# Patient Record
Sex: MALE | Race: WHITE | NOT HISPANIC OR LATINO | URBAN - NONMETROPOLITAN AREA
[De-identification: names, ages, dates, MRNs, and addresses within clinical notes are randomized per-mention and may not be internally consistent; named-entity substitution may affect disease eponyms.]

---

## 2021-06-16 PROBLEM — I10 ESSENTIAL HYPERTENSION: Status: ACTIVE | Noted: 2019-09-11

## 2021-06-16 PROBLEM — F10.20 SEVERE ALCOHOL USE DISORDER (H): Status: ACTIVE | Noted: 2019-09-11

## 2021-06-16 PROBLEM — F33.1 MODERATE EPISODE OF RECURRENT MAJOR DEPRESSIVE DISORDER (H): Status: ACTIVE | Noted: 2019-09-11

## 2024-02-21 ENCOUNTER — HOSPITAL ENCOUNTER (EMERGENCY)
Facility: HOSPITAL | Age: 74
Discharge: HOME | End: 2024-02-21
Attending: EMERGENCY MEDICINE | Admitting: EMERGENCY MEDICINE

## 2024-02-21 VITALS
HEART RATE: 62 BPM | OXYGEN SATURATION: 96 % | DIASTOLIC BLOOD PRESSURE: 79 MMHG | RESPIRATION RATE: 16 BRPM | TEMPERATURE: 97.5 F | SYSTOLIC BLOOD PRESSURE: 147 MMHG

## 2024-02-21 DIAGNOSIS — F10.21 HISTORY OF ALCOHOLISM (H): ICD-10-CM

## 2024-02-21 PROCEDURE — 99282 EMERGENCY DEPT VISIT SF MDM: CPT | Performed by: EMERGENCY MEDICINE

## 2024-02-21 PROCEDURE — 99282 EMERGENCY DEPT VISIT SF MDM: CPT

## 2024-02-21 ASSESSMENT — COLUMBIA-SUICIDE SEVERITY RATING SCALE - C-SSRS
2. HAVE YOU ACTUALLY HAD ANY THOUGHTS OF KILLING YOURSELF IN THE PAST MONTH?: NO
1. IN THE PAST MONTH, HAVE YOU WISHED YOU WERE DEAD OR WISHED YOU COULD GO TO SLEEP AND NOT WAKE UP?: NO
2. HAVE YOU ACTUALLY HAD ANY THOUGHTS OF KILLING YOURSELF IN THE PAST MONTH?: NO
1. IN THE PAST MONTH, HAVE YOU WISHED YOU WERE DEAD OR WISHED YOU COULD GO TO SLEEP AND NOT WAKE UP?: NO
6. HAVE YOU EVER DONE ANYTHING, STARTED TO DO ANYTHING, OR PREPARED TO DO ANYTHING TO END YOUR LIFE?: NO
6. HAVE YOU EVER DONE ANYTHING, STARTED TO DO ANYTHING, OR PREPARED TO DO ANYTHING TO END YOUR LIFE?: NO

## 2024-02-21 ASSESSMENT — ACTIVITIES OF DAILY LIVING (ADL): ADLS_ACUITY_SCORE: 35

## 2024-02-21 NOTE — ED NOTES
Care Transitions focused note:      Patient here with Mobile Crisis from Detox for mental health evaluation.    Pt was at detox awaiting a spot at in [place] [facility] and they wanted to send him home to await the bed and he made statements that if he were to return home he would self harm. They called Mobile Crisis Team and he was brought here for evaluation.  HE currently is denying any type of self harm.  Mobile Crisis worker has left at this time.    It sounds to me like Detox wanted to discharge him but pt made those statements.  They thought maybe he could go to our MH unit or board here until tomorrow.  Pt is stating to me that he has a friend who rents his place in [place] who will bring him to [place] tomorrow.  Pt admits to making those statements so they would not discharge him home.    In any event patient is not holdable and would like to get his car, go to a hotel tonight to await getting in to his facility tomorrow [place].    [facility] [place] to Enriqueta Mabry per patient request for continued care [phone]    Call to Andria at [facility] and she stated they are worried that if he were to be discharged home that he would be suicidal.      Pt adamantly denies being suicidal at this time. He would like to go back to detox to await his placement in [place].  If this is not possible he has the resources to go to a hotel and wait for his [place] bed tomorrow.    Call to Andria at [facility].  Message was left.    Awaiting call back.    TC Jewell

## 2024-02-21 NOTE — ED NOTES
AVS reviewed, no questions at this time.   Patient is going to wait in the waiting room for a taxi.

## 2024-02-21 NOTE — ED TRIAGE NOTES
Pt presents with making a mistake.  Was at detox and made a statement that he shouldn't be living.  Pt made this statement about 2 hours ago. Pt is in detox for drinking, pt stated that he has a spot in [facility] but doesn't have set date yet.  Pt had to get testing done due to [place] of the [place] this past year.  Pt is staying at detox until he can go there.  Pt denies SI. Denies being homicidal.   Patient last drink was about 2-3 weeks ago. Pt was talking to an employee and made a statement that he's screwed up so many peoples lives and he maybe just shouldn't be here.  Pt does have hx of anxiety.

## 2024-02-21 NOTE — ED PROVIDER NOTES
History     Chief Complaint   Patient presents with    Psychiatric Evaluation    Anxiety     HPI  Jeffrey King is a 73 year old male who presents with reported behavioral disturbance at the detox center where he stated using her himself without a specific plan or otherwise.  He notes he has no means, does not want hurt himself and states that he was unhappy.  He contracts for safety on arrival.  He does not drink or use drugs.  He comes to us for detox.  He is transitioning to a treatment facility in the [facility].      Allergies:  No Known Allergies    Problem List:    Patient Active Problem List    Diagnosis Date Noted    Moderate episode of recurrent major depressive disorder (H) 2019     Priority: Medium    Severe alcohol use disorder (H) 2019     Priority: Medium    Essential hypertension 2019     Priority: Medium        Past Medical History:    No past medical history on file.    Past Surgical History:    No past surgical history on file.    Family History:    No family history on file.    Social History:  Marital Status:  Single [1]  Social History     Tobacco Use    Smoking status: Former     Packs/day: 0.25     Years: 15.00     Additional pack years: 0.00     Total pack years: 3.75     Types: Cigarettes     Quit date: 2015     Years since quittin.4    Smokeless tobacco: Never   Substance Use Topics    Alcohol use: Yes     Alcohol/week: 105.0 standard drinks of alcohol    Drug use: Never        Medications:    albuterol (PROAIR HFA;PROVENTIL HFA;VENTOLIN HFA) 90 mcg/actuation inhaler  ARIPiprazole (ABILIFY) 5 MG tablet  buPROPion (WELLBUTRIN XL) 300 MG 24 hr tablet  chlorthalidone (HYGROTEN) 25 MG tablet  escitalopram oxalate (LEXAPRO) 20 MG tablet  fluticasone propionate (FLONASE) 50 mcg/actuation nasal spray  hydrOXYzine pamoate (VISTARIL) 25 MG capsule  lisinopril (PRINIVIL,ZESTRIL) 5 MG tablet  melatonin 3 mg Tab tablet  multivitamin therapeutic tablet  naltrexone  (DEPADE) 50 mg tablet  propranolol (INDERAL LA) 60 mg 24 hr capsule  rosuvastatin (CRESTOR) 5 MG tablet  thiamine 100 MG tablet          Review of Systems  Respiratory denies.  Cardiovascular denies.  GI denies.   denies.    Physical Exam   BP: 147/79  Pulse: 62  Temp: 97.5  F (36.4  C)  Resp: 16  SpO2: 96 %      Physical Exam  Constitutional:       Appearance: Normal appearance.   HENT:      Head: Normocephalic and atraumatic.      Nose: Nose normal.   Eyes:      Conjunctiva/sclera: Conjunctivae normal.   Cardiovascular:      Rate and Rhythm: Normal rate.      Pulses: Normal pulses.   Pulmonary:      Effort: Pulmonary effort is normal. No respiratory distress.      Breath sounds: No wheezing.   Abdominal:      General: There is no distension.      Palpations: Abdomen is soft.   Musculoskeletal:         General: Normal range of motion.      Cervical back: Normal range of motion and neck supple.   Skin:     General: Skin is warm and dry.      Capillary Refill: Capillary refill takes less than 2 seconds.   Neurological:      General: No focal deficit present.      Mental Status: He is alert.   Psychiatric:         Mood and Affect: Mood normal.              No results found for this or any previous visit (from the past 24 hour(s)).    Medications - No data to display    Assessments & Plan (with Medical Decision Making)   Patient is highly pleasant, denies any suicidality, would like to go to the [place] treatment facility.  He is not holdable.  He has no other concerns.  He admits that he was disappointed with detox for not allowing him to have his hydroxyzine in a timely fashion and did not like the temperature in the shared room he had with someone else.  He states this is why he acted out stating what he did.  He reportedly told him he was better off dead.  No other specifics.  Contracts for safety.  No complaints at this time.  Plan to discharge home at this time as the patient desires as he is not holdable.  He  intends to go to [place] tomorrow for treatment       New Prescriptions    No medications on file       Final diagnoses:   History of alcoholism (H)       2/21/2024   [facility] EM[facility]GENC[facility]       Satnam Healy MD  02/21/24 8545

## 2024-02-21 NOTE — ED NOTES
"Patient brought in by [facility] mobile crisis staff.   Patient was in Detox in [place] and made the comment \"maybe I'd be better off dead\".    Patient does not have any suicidal ideations at this time, he does not have a plan to hurt himself or anyone else.     He is A&Ox4.   Pleasant, calm, cooperative.   "

## 2024-02-21 NOTE — ED NOTES
Patient stated he wants to get to his car in [place].  He will either stay in [facility] or drive to [place].  Requesting ride to [place]    Call to Detox, and they are requesting information to be faxed which I will do in case patient changes his mind.    Discharged per provider.  Pt adamantly denying any self harm.    TC Jewell

## 2024-10-24 ENCOUNTER — HOSPITAL ENCOUNTER (INPATIENT)
Facility: HOSPITAL | Age: 74
LOS: 12 days | Discharge: HOME | DRG: 885 | End: 2024-11-05
Attending: EMERGENCY MEDICINE | Admitting: STUDENT IN AN ORGANIZED HEALTH CARE EDUCATION/TRAINING PROGRAM
Payer: COMMERCIAL

## 2024-10-24 DIAGNOSIS — F10.20 SEVERE ALCOHOL USE DISORDER (H): ICD-10-CM

## 2024-10-24 DIAGNOSIS — F41.8 DEPRESSION WITH ANXIETY: ICD-10-CM

## 2024-10-24 DIAGNOSIS — F33.1 MODERATE EPISODE OF RECURRENT MAJOR DEPRESSIVE DISORDER (H): Primary | ICD-10-CM

## 2024-10-24 LAB
ALBUMIN SERPL BCG-MCNC: 4.3 G/DL (ref 3.5–5.2)
ALBUMIN UR-MCNC: NEGATIVE MG/DL
ALP SERPL-CCNC: 72 U/L (ref 40–150)
ALT SERPL W P-5'-P-CCNC: 72 U/L (ref 0–70)
AMPHETAMINES UR QL SCN: ABNORMAL
ANION GAP SERPL CALCULATED.3IONS-SCNC: 14 MMOL/L (ref 7–15)
APPEARANCE UR: CLEAR
AST SERPL W P-5'-P-CCNC: 38 U/L (ref 0–45)
BARBITURATES UR QL SCN: ABNORMAL
BASOPHILS # BLD AUTO: 0 10E3/UL (ref 0–0.2)
BASOPHILS NFR BLD AUTO: 0 %
BENZODIAZ UR QL SCN: ABNORMAL
BILIRUB DIRECT SERPL-MCNC: <0.2 MG/DL (ref 0–0.3)
BILIRUB SERPL-MCNC: 0.5 MG/DL
BILIRUB UR QL STRIP: NEGATIVE
BUN SERPL-MCNC: 18.4 MG/DL (ref 8–23)
BZE UR QL SCN: ABNORMAL
CALCIUM SERPL-MCNC: 9.3 MG/DL (ref 8.8–10.4)
CANNABINOIDS UR QL SCN: ABNORMAL
CHLORIDE SERPL-SCNC: 101 MMOL/L (ref 98–107)
COLOR UR AUTO: ABNORMAL
CREAT SERPL-MCNC: 1.12 MG/DL (ref 0.67–1.17)
EGFRCR SERPLBLD CKD-EPI 2021: 69 ML/MIN/1.73M2
EOSINOPHIL # BLD AUTO: 0.1 10E3/UL (ref 0–0.7)
EOSINOPHIL NFR BLD AUTO: 2 %
ERYTHROCYTE [DISTWIDTH] IN BLOOD BY AUTOMATED COUNT: 13.2 % (ref 10–15)
ETHANOL SERPL-MCNC: <0.01 G/DL
FENTANYL UR QL: ABNORMAL
GLUCOSE SERPL-MCNC: 111 MG/DL (ref 70–99)
GLUCOSE UR STRIP-MCNC: 1000 MG/DL
HCO3 SERPL-SCNC: 21 MMOL/L (ref 22–29)
HCT VFR BLD AUTO: 42.4 % (ref 40–53)
HGB BLD-MCNC: 14.5 G/DL (ref 13.3–17.7)
HGB UR QL STRIP: NEGATIVE
HOLD SPECIMEN: NORMAL
IMM GRANULOCYTES # BLD: 0 10E3/UL
IMM GRANULOCYTES NFR BLD: 0 %
INR PPP: 1.02 (ref 0.85–1.15)
KETONES UR STRIP-MCNC: NEGATIVE MG/DL
LEUKOCYTE ESTERASE UR QL STRIP: NEGATIVE
LIPASE SERPL-CCNC: 25 U/L (ref 13–60)
LYMPHOCYTES # BLD AUTO: 1.1 10E3/UL (ref 0.8–5.3)
LYMPHOCYTES NFR BLD AUTO: 19 %
MAGNESIUM SERPL-MCNC: 2.3 MG/DL (ref 1.7–2.3)
MCH RBC QN AUTO: 30.7 PG (ref 26.5–33)
MCHC RBC AUTO-ENTMCNC: 34.2 G/DL (ref 31.5–36.5)
MCV RBC AUTO: 90 FL (ref 78–100)
MONOCYTES # BLD AUTO: 1.1 10E3/UL (ref 0–1.3)
MONOCYTES NFR BLD AUTO: 19 %
MUCOUS THREADS #/AREA URNS LPF: PRESENT /LPF
NEUTROPHILS # BLD AUTO: 3.5 10E3/UL (ref 1.6–8.3)
NEUTROPHILS NFR BLD AUTO: 59 %
NITRATE UR QL: NEGATIVE
NRBC # BLD AUTO: 0 10E3/UL
NRBC BLD AUTO-RTO: 0 /100
OPIATES UR QL SCN: ABNORMAL
PCP QUAL URINE (ROCHE): ABNORMAL
PH UR STRIP: 7 [PH] (ref 4.7–8)
PLATELET # BLD AUTO: 233 10E3/UL (ref 150–450)
POTASSIUM SERPL-SCNC: 4 MMOL/L (ref 3.4–5.3)
PROT SERPL-MCNC: 6.8 G/DL (ref 6.4–8.3)
RBC # BLD AUTO: 4.73 10E6/UL (ref 4.4–5.9)
RBC URINE: <1 /HPF
SODIUM SERPL-SCNC: 136 MMOL/L (ref 135–145)
SP GR UR STRIP: 1.02 (ref 1–1.03)
SQUAMOUS EPITHELIAL: 0 /HPF
TROPONIN T SERPL HS-MCNC: 21 NG/L
TSH SERPL DL<=0.005 MIU/L-ACNC: 3.08 UIU/ML (ref 0.3–4.2)
UROBILINOGEN UR STRIP-MCNC: NORMAL MG/DL
WBC # BLD AUTO: 5.8 10E3/UL (ref 4–11)
WBC URINE: 3 /HPF

## 2024-10-24 PROCEDURE — 81001 URINALYSIS AUTO W/SCOPE: CPT | Performed by: EMERGENCY MEDICINE

## 2024-10-24 PROCEDURE — 85025 COMPLETE CBC W/AUTO DIFF WBC: CPT | Performed by: EMERGENCY MEDICINE

## 2024-10-24 PROCEDURE — 82077 ASSAY SPEC XCP UR&BREATH IA: CPT | Performed by: EMERGENCY MEDICINE

## 2024-10-24 PROCEDURE — 82248 BILIRUBIN DIRECT: CPT | Performed by: EMERGENCY MEDICINE

## 2024-10-24 PROCEDURE — 99285 EMERGENCY DEPT VISIT HI MDM: CPT

## 2024-10-24 PROCEDURE — 99285 EMERGENCY DEPT VISIT HI MDM: CPT | Performed by: EMERGENCY MEDICINE

## 2024-10-24 PROCEDURE — 93010 ELECTROCARDIOGRAM REPORT: CPT | Performed by: INTERNAL MEDICINE

## 2024-10-24 PROCEDURE — 124N000001 HC R&B MH

## 2024-10-24 PROCEDURE — 36415 COLL VENOUS BLD VENIPUNCTURE: CPT | Performed by: EMERGENCY MEDICINE

## 2024-10-24 PROCEDURE — 80307 DRUG TEST PRSMV CHEM ANLYZR: CPT | Performed by: EMERGENCY MEDICINE

## 2024-10-24 PROCEDURE — 80053 COMPREHEN METABOLIC PANEL: CPT | Performed by: EMERGENCY MEDICINE

## 2024-10-24 PROCEDURE — 85610 PROTHROMBIN TIME: CPT | Performed by: EMERGENCY MEDICINE

## 2024-10-24 PROCEDURE — 83690 ASSAY OF LIPASE: CPT | Performed by: EMERGENCY MEDICINE

## 2024-10-24 PROCEDURE — 83735 ASSAY OF MAGNESIUM: CPT | Performed by: EMERGENCY MEDICINE

## 2024-10-24 PROCEDURE — 84443 ASSAY THYROID STIM HORMONE: CPT | Performed by: EMERGENCY MEDICINE

## 2024-10-24 PROCEDURE — 84484 ASSAY OF TROPONIN QUANT: CPT | Performed by: EMERGENCY MEDICINE

## 2024-10-24 RX ORDER — OLANZAPINE 10 MG/2ML
2.5 INJECTION, POWDER, FOR SOLUTION INTRAMUSCULAR 3 TIMES DAILY PRN
Status: DISCONTINUED | OUTPATIENT
Start: 2024-10-24 | End: 2024-11-05 | Stop reason: HOSPADM

## 2024-10-24 RX ORDER — LORAZEPAM 0.5 MG/1
0.5 TABLET ORAL 3 TIMES DAILY PRN
Status: ON HOLD | COMMUNITY
Start: 2024-10-10 | End: 2024-11-02

## 2024-10-24 RX ORDER — TOLTERODINE 4 MG/1
4 CAPSULE, EXTENDED RELEASE ORAL EVERY MORNING
COMMUNITY
Start: 2024-10-01

## 2024-10-24 RX ORDER — DULOXETIN HYDROCHLORIDE 30 MG/1
90 CAPSULE, DELAYED RELEASE ORAL EVERY MORNING
Status: ON HOLD | COMMUNITY
Start: 2024-04-30 | End: 2024-11-02

## 2024-10-24 RX ORDER — FLUTICASONE FUROATE AND VILANTEROL 100; 25 UG/1; UG/1
1 POWDER RESPIRATORY (INHALATION) EVERY MORNING
COMMUNITY
Start: 2024-05-20

## 2024-10-24 RX ORDER — MAGNESIUM HYDROXIDE/ALUMINUM HYDROXICE/SIMETHICONE 120; 1200; 1200 MG/30ML; MG/30ML; MG/30ML
30 SUSPENSION ORAL EVERY 4 HOURS PRN
Status: DISCONTINUED | OUTPATIENT
Start: 2024-10-24 | End: 2024-11-05 | Stop reason: HOSPADM

## 2024-10-24 RX ORDER — OLANZAPINE 2.5 MG/1
2.5 TABLET, FILM COATED ORAL 3 TIMES DAILY PRN
Status: DISCONTINUED | OUTPATIENT
Start: 2024-10-24 | End: 2024-11-05 | Stop reason: HOSPADM

## 2024-10-24 RX ORDER — MENTHOL 40 MG/ML
GEL TOPICAL PRN
COMMUNITY
End: 2024-10-24

## 2024-10-24 RX ORDER — LIDOCAINE 4 G/G
1 PATCH TOPICAL EVERY 24 HOURS
COMMUNITY
End: 2024-10-24

## 2024-10-24 RX ORDER — MULTIVITAMIN,THERAPEUTIC
1 TABLET ORAL EVERY MORNING
COMMUNITY

## 2024-10-24 RX ORDER — FOLIC ACID/MV,IRON,MIN/LUTEIN 0.4-18-25
1 TABLET ORAL DAILY
COMMUNITY
Start: 2024-03-20 | End: 2024-10-24

## 2024-10-24 RX ORDER — AMOXICILLIN 250 MG
2 CAPSULE ORAL 2 TIMES DAILY PRN
Status: ON HOLD | COMMUNITY
End: 2024-11-04

## 2024-10-24 RX ORDER — LISINOPRIL 20 MG/1
1 TABLET ORAL EVERY MORNING
COMMUNITY
Start: 2024-05-01

## 2024-10-24 RX ORDER — NITROGLYCERIN 0.4 MG/1
0.4 TABLET SUBLINGUAL EVERY 5 MIN PRN
COMMUNITY
Start: 2024-05-30

## 2024-10-24 RX ORDER — NALTREXONE HYDROCHLORIDE 50 MG/1
50 TABLET, FILM COATED ORAL DAILY
Status: ON HOLD | COMMUNITY
End: 2024-11-04

## 2024-10-24 RX ORDER — GABAPENTIN 400 MG/1
1 CAPSULE ORAL 3 TIMES DAILY
COMMUNITY
Start: 2024-09-26

## 2024-10-24 RX ORDER — HYDROXYZINE HYDROCHLORIDE 50 MG/1
50 TABLET, FILM COATED ORAL 4 TIMES DAILY PRN
COMMUNITY
Start: 2024-09-25

## 2024-10-24 RX ORDER — GABAPENTIN 100 MG/1
100 CAPSULE ORAL EVERY 6 HOURS PRN
Status: DISCONTINUED | OUTPATIENT
Start: 2024-10-24 | End: 2024-10-25

## 2024-10-24 RX ORDER — LANOLIN ALCOHOL/MO/W.PET/CERES
100 CREAM (GRAM) TOPICAL EVERY MORNING
COMMUNITY

## 2024-10-24 RX ORDER — VITAMIN B COMPLEX
25 TABLET ORAL DAILY
COMMUNITY

## 2024-10-24 RX ORDER — FUROSEMIDE 40 MG/1
1 TABLET ORAL EVERY MORNING
COMMUNITY
Start: 2024-07-29

## 2024-10-24 RX ORDER — ASPIRIN 81 MG/1
81 TABLET ORAL EVERY MORNING
COMMUNITY

## 2024-10-24 RX ORDER — ACETAMINOPHEN 325 MG/1
650 TABLET ORAL EVERY 4 HOURS PRN
Status: DISCONTINUED | OUTPATIENT
Start: 2024-10-24 | End: 2024-11-05 | Stop reason: HOSPADM

## 2024-10-24 ASSESSMENT — ACTIVITIES OF DAILY LIVING (ADL)
ADLS_ACUITY_SCORE: 0
ORAL_HYGIENE: INDEPENDENT
DRESS: SCRUBS (BEHAVIORAL HEALTH)
ADLS_ACUITY_SCORE: 0
LAUNDRY: UNABLE TO COMPLETE
ADLS_ACUITY_SCORE: 0
HYGIENE/GROOMING: INDEPENDENT
ADLS_ACUITY_SCORE: 0
ADLS_ACUITY_SCORE: 0

## 2024-10-24 ASSESSMENT — COLUMBIA-SUICIDE SEVERITY RATING SCALE - C-SSRS
2. HAVE YOU ACTUALLY HAD ANY THOUGHTS OF KILLING YOURSELF IN THE PAST MONTH?: NO
1. IN THE PAST MONTH, HAVE YOU WISHED YOU WERE DEAD OR WISHED YOU COULD GO TO SLEEP AND NOT WAKE UP?: YES
6. HAVE YOU EVER DONE ANYTHING, STARTED TO DO ANYTHING, OR PREPARED TO DO ANYTHING TO END YOUR LIFE?: NO

## 2024-10-24 NOTE — ED PROVIDER NOTES
"  History     Chief Complaint   Patient presents with    Psychiatric Evaluation    Mental Health Problem     HPI  Jeffrey King is a 74 year old male who presents to the ED on his own accord with crisis staff who was contacted by the patient.  Patient had attempted to get himself admitted to the [facility] but they were full.  He has a history of alcohol abuse and dependency and quit drinking over 2 weeks ago.  Since that time he has had increased anxiety.  His PCP had given him a small amount of Ativan and this morning he was feeling so anxious that he took 3 tablets of Ativan 0.5 mg.  It did not help with anxiety or depressive symptoms made him feel \"very tired\".  Patient is feeling like he wants to just lay down or die, he made a comment to the crisis worker that he may be out of get a bottle of bruising just drink himself into oblivion, he does not want to feel like this anymore.  He denies being actively suicidal or making any attempt to harm himself.  Denies any overdose.  Patient denies any homicidal ideation, no hallucination.  Patient was tearful stating \"I cannot take it anymore, I think I am going to go crazy.\"    Allergies:  No Known Allergies    Problem List:    Patient Active Problem List    Diagnosis Date Noted    Moderate episode of recurrent major depressive disorder (H) 09/11/2019     Priority: Medium    Severe alcohol use disorder (H) 09/11/2019     Priority: Medium    Essential hypertension 09/11/2019     Priority: Medium        Past Medical History:    History reviewed. No pertinent past medical history.    Past Surgical History:    History reviewed. No pertinent surgical history.    Family History:    History reviewed. No pertinent family history.    Social History:  Marital Status:  Single [1]  Social History     Tobacco Use    Smoking status: Former     Current packs/day: 0.00     Average packs/day: 0.3 packs/day for 15.0 years (3.8 ttl pk-yrs)     Types: Cigarettes     Start " date: 2000     Quit date: 2015     Years since quittin.1    Smokeless tobacco: Never   Substance Use Topics    Alcohol use: Yes     Alcohol/week: 105.0 standard drinks of alcohol    Drug use: Never        Medications:    albuterol (PROAIR HFA;PROVENTIL HFA;VENTOLIN HFA) 90 mcg/actuation inhaler  ARIPiprazole (ABILIFY) 5 MG tablet  buPROPion (WELLBUTRIN XL) 300 MG 24 hr tablet  chlorthalidone (HYGROTEN) 25 MG tablet  escitalopram oxalate (LEXAPRO) 20 MG tablet  fluticasone propionate (FLONASE) 50 mcg/actuation nasal spray  hydrOXYzine pamoate (VISTARIL) 25 MG capsule  lisinopril (PRINIVIL,ZESTRIL) 5 MG tablet  melatonin 3 mg Tab tablet  multivitamin therapeutic tablet  naltrexone (DEPADE) 50 mg tablet  propranolol (INDERAL LA) 60 mg 24 hr capsule  rosuvastatin (CRESTOR) 5 MG tablet  thiamine 100 MG tablet          Review of Systems   All other systems reviewed and are negative.      Physical Exam   BP: 164/95  Pulse: 69  Temp: 96.8  F (36  C)  Resp: 18  SpO2: 97 %      Physical Exam  Vitals and nursing note reviewed.   Constitutional:       General: He is not in acute distress.     Appearance: Normal appearance. He is not toxic-appearing.   HENT:      Head: Atraumatic.   Eyes:      General: No scleral icterus.     Conjunctiva/sclera: Conjunctivae normal.   Cardiovascular:      Rate and Rhythm: Normal rate.      Heart sounds: Normal heart sounds.   Pulmonary:      Effort: Pulmonary effort is normal. No respiratory distress.      Breath sounds: Normal breath sounds.   Abdominal:      Palpations: Abdomen is soft.      Tenderness: There is no abdominal tenderness.   Musculoskeletal:         General: No deformity.      Cervical back: Neck supple.   Skin:     General: Skin is warm.   Neurological:      Mental Status: He is alert.         ED Course     ED Course as of 10/24/24 1636   Thu Oct 24, 2024   1632 Call placed to psychiatry to discuss possibility of admission, DEC evaluation recommended admission for  patient's severe depression and anxiety.   1636 Discussed case with Dr. Orosco psychiatrist who accepted patient for admission to to the behavioral health unit     Procedures              Critical Care time:  none          EKG performed at 1449 reviewed at 1449 see below interpretation, agree with interpretation    Results for orders placed or performed during the hospital encounter of 10/24/24 (from the past 24 hours)   CBC with platelets differential    Narrative    The following orders were created for panel order CBC with platelets differential.  Procedure                               Abnormality         Status                     ---------                               -----------         ------                     CBC with platelets and d...[[id number]]                      Final result                 Please view results for these tests on the individual orders.   INR   Result Value Ref Range    INR 1.02 0.85 - 1.15   Comprehensive metabolic panel   Result Value Ref Range    Sodium 136 135 - 145 mmol/L    Potassium 4.0 3.4 - 5.3 mmol/L    Carbon Dioxide (CO2) 21 (L) 22 - 29 mmol/L    Anion Gap 14 7 - 15 mmol/L    Urea Nitrogen 18.4 8.0 - 23.0 mg/dL    Creatinine 1.12 0.67 - 1.17 mg/dL    GFR Estimate 69 >60 mL/min/1.73m2    Calcium 9.3 8.8 - 10.4 mg/dL    Chloride 101 98 - 107 mmol/L    Glucose 111 (H) 70 - 99 mg/dL    Alkaline Phosphatase 72 40 - 150 U/L    AST 38 0 - 45 U/L    ALT 72 (H) 0 - 70 U/L    Protein Total 6.8 6.4 - 8.3 g/dL    Albumin 4.3 3.5 - 5.2 g/dL    Bilirubin Total 0.5 <=1.2 mg/dL   Lipase   Result Value Ref Range    Lipase 25 13 - 60 U/L   Magnesium   Result Value Ref Range    Magnesium 2.3 1.7 - 2.3 mg/dL   TSH with free T4 reflex   Result Value Ref Range    TSH 3.08 0.30 - 4.20 uIU/mL   Ethyl Alcohol Level   Result Value Ref Range    Alcohol ethyl <0.01 <=0.01 g/dL   Bilirubin direct   Result Value Ref Range    Bilirubin Direct <0.20 0.00 - 0.30 mg/dL   CBC with platelets and  differential   Result Value Ref Range    WBC Count 5.8 4.0 - 11.0 10e3/uL    RBC Count 4.73 4.40 - 5.90 10e6/uL    Hemoglobin 14.5 13.3 - 17.7 g/dL    Hematocrit 42.4 40.0 - 53.0 %    MCV 90 78 - 100 fL    MCH 30.7 26.5 - 33.0 pg    MCHC 34.2 31.5 - 36.5 g/dL    RDW 13.2 10.0 - 15.0 %    Platelet Count 233 150 - 450 10e3/uL    % Neutrophils 59 %    % Lymphocytes 19 %    % Monocytes 19 %    % Eosinophils 2 %    % Basophils 0 %    % Immature Granulocytes 0 %    NRBCs per 100 WBC 0 <1 /100    Absolute Neutrophils 3.5 1.6 - 8.3 10e3/uL    Absolute Lymphocytes 1.1 0.8 - 5.3 10e3/uL    Absolute Monocytes 1.1 0.0 - 1.3 10e3/uL    Absolute Eosinophils 0.1 0.0 - 0.7 10e3/uL    Absolute Basophils 0.0 0.0 - 0.2 10e3/uL    Absolute Immature Granulocytes 0.0 <=0.4 10e3/uL    Absolute NRBCs 0.0 10e3/uL   Extra Tube    Narrative    The following orders were created for panel order Extra Tube.  Procedure                               Abnormality         Status                     ---------                               -----------         ------                     Extra Red Top Tube[[id number]]                               Final result                 Please view results for these tests on the individual orders.   Extra Red Top Tube   Result Value Ref Range    Hold Specimen JIC    Troponin T, High Sensitivity   Result Value Ref Range    Troponin T, High Sensitivity 21 <=22 ng/L   EKG 12-lead, tracing only   Result Value Ref Range    Systolic Blood Pressure  mmHg    Diastolic Blood Pressure  mmHg    Ventricular Rate 67 BPM    Atrial Rate 67 BPM    NJ Interval 162 ms    QRS Duration 108 ms     ms    QTc 435 ms    P Axis 63 degrees    R AXIS 66 degrees    T Axis 72 degrees    Interpretation ECG       Sinus rhythm  Incomplete right bundle branch block  Nonspecific T wave abnormality  Abnormal ECG  No previous ECGs available     UA with Microscopic reflex to Culture    Specimen: Urine, Midstream   Result Value Ref Range    Color  Urine Light Yellow Colorless, Straw, Light Yellow, Yellow    Appearance Urine Clear Clear    Glucose Urine 1000 (A) Negative mg/dL    Bilirubin Urine Negative Negative    Ketones Urine Negative Negative mg/dL    Specific Gravity Urine 1.020 1.003 - 1.035    Blood Urine Negative Negative    pH Urine 7.0 4.7 - 8.0    Protein Albumin Urine Negative Negative mg/dL    Urobilinogen Urine Normal Normal, 2.0 mg/dL    Nitrite Urine Negative Negative    Leukocyte Esterase Urine Negative Negative    Mucus Urine Present (A) None Seen /LPF    RBC Urine <1 <=2 /HPF    WBC Urine 3 <=5 /HPF    Squamous Epithelials Urine 0 <=1 /HPF    Narrative    Urine Culture not indicated   Urine Drug Screen    Narrative    The following orders were created for panel order Urine Drug Screen.  Procedure                               Abnormality         Status                     ---------                               -----------         ------                     Urine Drug Screen Panel[[id number]]      Abnormal            Final result                 Please view results for these tests on the individual orders.   Urine Drug Screen Panel   Result Value Ref Range    Amphetamines Urine Screen Negative Screen Negative    Barbituates Urine Screen Negative Screen Negative    Benzodiazepine Urine Screen Positive (A) Screen Negative    Cannabinoids Urine Screen Negative Screen Negative    Cocaine Urine Screen Negative Screen Negative    Fentanyl Qual Urine Screen Negative Screen Negative    Opiates Urine Screen Negative Screen Negative    PCP Urine Screen Negative Screen Negative       Medications - No data to display    Assessments & Plan (with Medical Decision Making)     I have reviewed the nursing notes.    I have reviewed the findings, diagnosis, plan and need for follow up with the patient.           Medical Decision Making  The patient's presentation was of high complexity (an acute health issue posing potential threat to life or bodily  function).    The patient's evaluation involved:  ordering and/or review of 3+ test(s) in this encounter (multiple lab tests and EKG)    The patient's management necessitated high risk (a decision regarding hospitalization).        New Prescriptions    No medications on file       Final diagnoses:   Depression with anxiety       10/24/2024   [facility]       Angelo Orozco MD  10/24/24 2888

## 2024-10-24 NOTE — ED TRIAGE NOTES
Patient lives alone. Took 3 lorazepam this morning for bad anxiety. States he does not want to kill himself but wanted to but alcohol and drink, hopefully not to wake up again. Tearful. Patient had called [employer]a and called his insurance lady and she called mobile crisis

## 2024-10-24 NOTE — MEDICATION SCRIBE - ADMISSION MEDICATION HISTORY
Medication Scribe Admission Medication History    Admission medication history is complete. The information provided in this note is only as accurate as the sources available at the time of the update.    Information Source(s): Patient and CareEverywhere/SureScripts , [facility] via phone    Pertinent Information:   Patient manages his own medications- fair historian. [facility] and [facility] patient, Care Everywhere reviewed, [facility] chart reviewed. Dispense hx checked.   Conflicting data on metoprolol between facilities- per [facility]- dose is supposed to be 100 mg, per [facility] dose is 25 mg. Patient does not know what strength he takes. Fill hx included in notes on med.   Conflicting data on rosuvastatin between facilities- per [facility] dose is 20 mg, per [facility]-dose is 5 mg. Patient does not know what strength he takes. Fill hx included in notes on med.  Lorazepam- pt took 3 tablets today PTA.  Naltrexone- reports he is supposed to be take but has not been.       Changes made to PTA medication list:  Added: removed and re-entered all medications  Deleted: removed outdated medications  Changed:   Omeprazole- prescribed scheduled, pt takes PRN  Senna-s: listed at [facility], pt reports he would like to take, has been taking an OTC stool softener.    Allergies reviewed with patient and updates made in EHR: yes    Medication History Completed By: Silvia Jones 10/24/2024 7:08 PM    PTA Med List   Medication Sig Note Last Dose/Taking    aspirin 81 MG EC tablet Take 81 mg by mouth every morning.  10/24/2024    Docusate Calcium (STOOL SOFTENER PO) Take 1 capsule by mouth daily.  10/24/2024    DULoxetine (CYMBALTA) 30 MG capsule Take 90 mg by mouth every morning. Do not crush.  10/24/2024 at  8:00 AM    empagliflozin (JARDIANCE) 10 MG TABS tablet Take 10 mg by mouth every morning. Do not chew or crush.  10/24/2024 at  8:00 AM    fluticasone-vilanterol (BREO ELLIPTA) 100-25 MCG/ACT inhaler Inhale 1 puff into the lungs  every morning. Rinse mouth after use.  10/24/2024 at  8:00 AM    furosemide (LASIX) 40 MG tablet Take 1 tablet by mouth every morning.  10/24/2024 Morning    gabapentin (NEURONTIN) 400 MG capsule Take 1 capsule by mouth 3 times daily.  10/24/2024 Morning    hydrOXYzine HCl (ATARAX) 50 MG tablet Take 50 mg by mouth 4 times daily as needed for anxiety.  Past Month    lisinopril (ZESTRIL) 20 MG tablet Take 1 tablet by mouth every morning.  10/24/2024 Morning    LORazepam (ATIVAN) 0.5 MG tablet Take 0.5 mg by mouth 3 times daily as needed for anxiety.  10/24/2024 Morning    melatonin 3 MG tablet Take 3 mg by mouth at bedtime.  Past Week Bedtime    METOPROLOL SUCCINATE ER PO Take 1 tablet by mouth every morning. Do not crush or chew. 10/24/2024: [facility]- 100 mg (filled 7/22/24 14 tabs) , [facility]- 25 mg (7/11/24 90 tabs) 10/24/2024 Morning    multivitamin, therapeutic (THERA-VIT) TABS tablet Take 1 tablet by mouth every morning.  10/24/2024 Morning    nitroGLYcerin (NITROSTAT) 0.4 MG sublingual tablet Place 0.4 mg under the tongue every 5 minutes as needed for chest pain. Do not crush; maximum of 3 doses in 15 minutes.  Unknown    OMEGA 3-6-9 FATTY ACIDS PO Take 2 capsules by mouth 2 times daily.  10/24/2024 Morning    omeprazole (PRILOSEC) 20 MG DR capsule Take 20 mg by mouth daily as needed (heartburn). Take before meals. Do not crush.  Past Month    ROSUVASTATIN CALCIUM PO Take 1 tablet by mouth at bedtime. 10/24/2024: [facility]- 5 mg (last filled 4/12/24 90 tabs), [facility]-20 mg (last filled 7/22/24 24 tabs and 5/30/24 90 tabs) 10/23/2024    thiamine (B-1) 100 MG tablet Take 100 mg by mouth every morning.  10/24/2024    tolterodine ER (DETROL LA) 4 MG 24 hr capsule Take 4 mg by mouth every morning.  10/24/2024 Morning    Vitamin D3 (VITAMIN D, CHOLECALCIFEROL,) 25 mcg (1000 units) tablet Take 25 mcg by mouth daily. 10/24/2024: Pt unsure if currently taking.  Unknown

## 2024-10-24 NOTE — ED NOTES
"Patient in room 8 with mobile crisis staff.     Patient states that he is feeling sad and just wants to cry. States he tried to get help and wanted to get admitted to the [facility] but they are full. He states he is here today to get help. Patient states that he was given Ativan 0.5 mg by his provider to try if he feels anxious and states he took 3 of them around 0800. States this was not to hurt himself but he just wanted to feel better. He states that he is an alcoholic and wants help for both his depression and his alcoholism. Patient states he last drank a small bottle of booze about 2 weeks ago. States that this has been normal for him to go 2 weeks in between drinking \"until I get going. Then I keep drinking until I get sick.\" Denies any drug use.   "

## 2024-10-24 NOTE — CONSULTS
"Diagnostic Evaluation Consultation  Crisis Assessment    Patient Name: Jeffrey King  Age:  74 year old  Legal Sex: male  Gender Identity: male  Pronouns:   Race: White  Ethnicity: Not  or   Language: English      Patient was assessed: Virtual: MUBI   Crisis Assessment Start Date: 10/24/24  Crisis Assessment Start Time: 1532  Crisis Assessment Stop Time: 1550  Patient location: [facility] 506-1    Referral Data and Chief Complaint  Jeffrey King presents to the ED per community partner(s). Patient is presenting to the ED for the following concerns: Depression, Suicidal ideation, Anxiety.   Factors that make the mental health crisis life threatening or complex are:  Jeffrey was brought to the ER by a MH Crisis responder from [facility].  Patient stated, \"I am a basket case, I can't stop crying, I need help:     Jeffrey reports increased depression and anxiety in the last two weeks.  He reports he tried to go to the [facility] residence today but they did not have any beds.   Patient reports he awoke with anxieyt and he took 3 Ativan.  He denies this was an attempt to kill himself but stated, \"I just wanted to feel better\".  Patient denies suicidal thoughts or wishing he was dead, but stated, \"If I go home, I will drink myself to death\".  Patient reports history of self medicating with alcohol but states he hasn't had a drink in over 3 days.  Patient reports stressors include; not having a support system(all of family is dead), no purpose(retired ) and recent open heart surgery.   He cried as he  said, \"I am so alone\"..      Informed Consent and Assessment Methods  Explained the crisis assessment process, including applicable information disclosures and limits to confidentiality, assessed understanding of the process, and obtained consent to proceed with the assessment.  Assessment methods included conducting a formal interview " with patient, review of medical records, collaboration with medical staff, and obtaining relevant collateral information from family and community providers when available.  :       Patient response to interventions: eager to participate  Coping skills were attempted to reduce the crisis:        History of the Crisis   Reports history of MDD, Anxiety and CAROL.   Patient reports he has been admitted in the past for depression at [facility]. He reports it was many years ago.    Patient reports he has been to the [facility] residence and felt safe/comfortable there.   He reports he takes medication as prescribed    Brief Psychosocial History  Family:  Single, Children no  Support System:  Neighbor  Employment Status:  retired  Source of Income:  pension/detention  Financial Environmental Concerns:  none  Current Hobbies:     Barriers in Personal Life:  mental health concerns    Significant Clinical History  Current Anxiety Symptoms:  anxious, racing thoughts, excessive worry  Current Depression/Trauma:  sadness, thoughts of death/suicide, crying or feels like crying, helplessness  Current Somatic Symptoms:  anxious, racing thoughts, excessive worry  Current Psychosis/Thought Disturbance:     Current Eating Symptoms:     Chemical Use History:  Alcohol: Daily  Last Use:: 10/20/24  Benzodiazepines: None  Opiates: None  Cocaine: None  Marijuana: None   Past diagnosis:  Anxiety Disorder, Depression  Family history:     Past treatment:  Individual therapy, Psychiatric Medication Management, Supportive Living Environment (group home, residential house, etc), Residential Treatment  Details of most recent treatment:     Other relevant history:          Collateral Information  Is there collateral information:       Collateral information name, relationship, phone number:       What happened today:       What is different about patient's functioning:       Concern about alcohol/drug use:      What do you think  the patient needs:      Has patient made comments about wanting to kill themselves/others:      If d/c is recommended, can they take part in safety/aftercare planning:       Additional collateral information:        Risk Assessment  [place] Suicide Severity Rating Scale Full Clinical Version:  Suicidal Ideation  Q1 Wish to be Dead (Lifetime): Yes  Q2 Non-Specific Active Suicidal Thoughts (Lifetime): Yes  3. Active Suicidal Ideation with any Methods (Not Plan) Without Intent to Act (Lifetime): Yes  Q4 Active Suicidal Ideation with Some Intent to Act, Without Specific Plan (Lifetime): No  Q5 Active Suicidal Ideation with Specific Plan and Intent (Lifetime): No  Q6 Suicide Behavior (Lifetime): no  Intensity of Ideation (Lifetime)  Most Severe Ideation Rating (Lifetime): 3  Frequency (Lifetime): Less than once a week  Duration (Lifetime): Fleeting, few seconds or minutes  Controllability (Lifetime): Easily able to control thoughts  Deterrents (Lifetime): Deterrents definitely stopped you from attempting suicide  Reasons for Ideation (Lifetime): Completely to end or stop the pain (You couldn't go on living with the pain or how you were feeling)  Suicidal Behavior (Lifetime)  Actual Attempt (Lifetime): No  Has subject engaged in non-suicidal self-injurious behavior? (Lifetime): No  Interrupted Attempts (Lifetime): No  Aborted or Self-Interrupted Attempt (Lifetime): No    [place] Suicide Severity Rating Scale Recent:   Suicidal Ideation (Recent)  Q1 Wished to be Dead (Past Month): yes  Q2 Suicidal Thoughts (Past Month): yes  Q3 Suicidal Thought Method: yes  Q4 Suicidal Intent without Specific Plan: no  Q5 Suicide Intent with Specific Plan: no  Level of Risk per Screen: moderate risk  Intensity of Ideation (Recent)  Most Severe Ideation Rating (Past 1 Month): 5  Frequency (Past 1 Month): Daily or almost daily  Duration (Past 1 Month): 1-4 hours/a lot of time  Controllability (Past 1 Month): Can control thoughts with some  difficulty  Deterrents (Past 1 Month): Deterrents definitely stopped you from attempting suicide  Reasons for Ideation (Past 1 Month): Completely to end or stop the pain (You couldn't go on living with the pain or how you were feeling)  Suicidal Behavior (Recent)  Actual Attempt (Past 3 Months): No  Has subject engaged in non-suicidal self-injurious behavior? (Past 3 Months): No  Interrupted Attempts (Past 3 Months): No  Aborted or Self-Interrupted Attempt (Past 3 Months): No  Preparatory Acts or Behavior (Past 3 Months): No    Environmental or Psychosocial Events: neither working nor attending school  Protective Factors: Protective Factors: lives in a responsibly safe and stable environment, sense of importance of health and wellness, able to access care without barriers, help seeking    Does the patient have thoughts of harming others? Feels Like Hurting Others: no  Previous Attempt to Hurt Others: no  Is the patient engaging in sexually inappropriate behavior?: no    Is the patient engaging in sexually inappropriate behavior?  no        Mental Status Exam   Affect: Flat  Appearance: Appropriate  Attention Span/Concentration: Attentive  Eye Contact: Variable    Fund of Knowledge: Appropriate   Language /Speech Content: Fluent  Language /Speech Volume: Normal  Language /Speech Rate/Productions: Normal  Recent Memory: Intact  Remote Memory: Intact  Mood: Anxious, Sad  Orientation to Person: Yes   Orientation to Place: Yes  Orientation to Time of Day: Yes  Orientation to Date: Yes     Situation (Do they understand why they are here?): Yes  Psychomotor Behavior: Normal  Thought Content: Clear  Thought Form: Intact     Mini-Cog Assessment  Number of Words Recalled:    Clock-Drawing Test:     Three Item Recall:    Mini-Cog Total Score:       Medication  Psychotropic medications:   Medication Orders - Psychiatric (From admission, onward)      Start     Dose/Rate Route Frequency Ordered Stop    10/24/24 8519  OLANZapine  "(zyPREXA) tablet 2.5 mg        Placed in \"Or\" Linked Group    2.5 mg Oral 3 TIMES DAILY PRN 10/24/24 1813      10/24/24 1813  OLANZapine (zyPREXA) injection 2.5 mg        Placed in \"Or\" Linked Group    2.5 mg Intramuscular 3 TIMES DAILY PRN 10/24/24 1813      10/24/24 1813  nicotine (NICORETTE) gum 2 mg         2 mg Buccal EVERY 1 HOUR PRN 10/24/24 1813               Current Care Team  Patient Care Team:  No Ref-Primary, Physician as PCP - General    Diagnosis  Patient Active Problem List   Diagnosis Code    Moderate episode of recurrent major depressive disorder (H) F33.1    Severe alcohol use disorder (H) F10.20    Essential hypertension I10    Depression with anxiety F41.8       Primary Problem This Admission  Active Hospital Problems    *Depression with anxiety        Clinical Summary and Substantiation of Recommendations   It is the recommendation of this clinician that the pt be admitted to [facility] for safety and stabilization. Pt endorses  passive suicidal thoughts and reports if discharged he will go home and \"drink himself to death\".  Patient took 3 Ativan this morning in effort to reduce depression and anxiety but states it made it worse.    Pt also endorses symptoms of worsening depression and anxiety.    Lower levels of care have been unsuccessful in managing symptoms. Therapeutic intervention in the ER has not lessened intensity of SI and pt has been unable to engage in safety planning. Pt has limited protective factors and several risk factors for suicide. For this reason, a secure setting with 24 hour supervision is the most appropriate and least restrictive option available for the pt.          Severe psychiatric, behavioral or other comorbid conditions are appropriate for management at inpatient mental health as indicated by at least one of the following: Psychiatric Symptoms, Impaired impulse control, judgement, or insight, Comorbid substance use disorder  Severe dysfunction in daily living is " present as indicated by at least one of the following: Other evidence of severe dysfunction     Inpatient mental health services are necessary to meet patient needs and at least one of the following: Specific condition related to admission diagnosis is present and judged likely to deteriorate in absence of treatment at proposed level of care      Patient coping skills attempted to reduce the crisis:       Disposition  Recommended disposition: Inpatient Mental Health        Reviewed case and recommendations with attending provider. Attending Name:         Attending concurs with disposition: yes       Patient and/or validated legal guardian concurs with disposition:   yes       Final disposition:  inpatient mental health    Legal status on admission: Voluntary/Patient has signed consent for treatment    Assessment Details   Total duration spent with the patient: 16 min     CPT code(s) utilized: Non-Billable    BRENDA Bajwa, Psychotherapist  [facility] - Triage & Transition Services  Callback: [phone]

## 2024-10-24 NOTE — PLAN OF CARE
"ADMISSION NOTE    Reason for admission: increased depression - feelings of worthlessness.  Safety concerns: fall risk.  Risk for or history of violence: none.  Skin Assessment: Scar down midline with 3 horizontal scars from heart surgery, 2 bruises on the right abdomen, right elbow 2 small scabs, right knee 1 small scab     Patient arrived on unit from our ED accompanied by staff and security on 10/24/2024  6:08 PM.     Status on arrival: calm and cooperative    /74   Pulse 83   Temp 97.6  F (36.4  C) (Temporal)   Resp 20   SpO2 95%     Patient given tour of unit and Welcome to  unit papers given to patient, wanding completed, belongings inventoried, and admission assessment was completed.   Patient's legal status on arrival is voluntary. Appropriate legal rights discussed with and copy given to patient. Patient Bill of Rights discussed with and copy given to patient.     Patient denies SI, HI, and thoughts of self harm and contracts for safety while on unit.      SHIFT SUMMARY: Patient is calm and cooperative upon arrival. Compliant with changing into scrubs. Patient does exhibit some shortness of breath, although oxygen saturation remains WNL, patient states this is normal for him and he does not utilize supplemental oxygen or inhalers. Patient uses depends, states he has urinary incontinence but is not usually incontinent of stool. Patient is able to walk, although is slow but appears to be steady. Patient reports falls but only while intoxicated. Patient states he wanted to go to [facility] where he has been before when having depressed feelings or intense anxiety, but Medicare does not cover that anymore, but he knew he needed some help. Patient states he is lonely and has a \"lack of purpose\". Patient reports that since he does not have an occupation anymore due to long term, he feels like he has no purpose. Patient states \"I just sit around my home and do nothing, its making me feel so useless and " "it is driving me crazy. Then I want to drink, but I know I shouldn't\". Patient requesting to rest at this time, offered bed wedge, patient declined, stating that laying flat helps with his breathing more than sitting up. No further needs at this time.     Harriet Huff RN  10/24/2024  6:57 PM     Problem: Adult Behavioral Health Plan of Care  Goal: Patient-Specific Goal (Individualization)  Description: Patient will sleep 6 to 8 hours per night  Patient will eat at least 50% of meals  Patient will attend at least 50% of groups  Patient will comply with recommendations of treatment team  Patient will remain medication compliant  Outcome: Progressing     Problem: Depression  Goal: Decreased Depression Symptoms  Description: Patient will verbalize a decrease in depression, and increase in mood by discharge.  Outcome: Progressing     "

## 2024-10-25 PROBLEM — I48.0 PAROXYSMAL ATRIAL FIBRILLATION (H): Status: ACTIVE | Noted: 2021-01-22

## 2024-10-25 PROBLEM — Z95.1 S/P CABG X 4: Status: ACTIVE | Noted: 2024-07-17

## 2024-10-25 PROBLEM — G47.33 OBSTRUCTIVE SLEEP APNEA (ADULT) (PEDIATRIC): Status: ACTIVE | Noted: 2024-07-22

## 2024-10-25 LAB
ATRIAL RATE - MUSE: 67 BPM
DIASTOLIC BLOOD PRESSURE - MUSE: NORMAL MMHG
INTERPRETATION ECG - MUSE: NORMAL
P AXIS - MUSE: 63 DEGREES
PR INTERVAL - MUSE: 162 MS
QRS DURATION - MUSE: 108 MS
QT - MUSE: 412 MS
QTC - MUSE: 435 MS
R AXIS - MUSE: 66 DEGREES
SYSTOLIC BLOOD PRESSURE - MUSE: NORMAL MMHG
T AXIS - MUSE: 72 DEGREES
VENTRICULAR RATE- MUSE: 67 BPM

## 2024-10-25 PROCEDURE — 250N000013 HC RX MED GY IP 250 OP 250 PS 637: Performed by: NURSE PRACTITIONER

## 2024-10-25 PROCEDURE — 250N000013 HC RX MED GY IP 250 OP 250 PS 637

## 2024-10-25 PROCEDURE — 99223 1ST HOSP IP/OBS HIGH 75: CPT | Mod: AI | Performed by: NURSE PRACTITIONER

## 2024-10-25 PROCEDURE — 99222 1ST HOSP IP/OBS MODERATE 55: CPT | Performed by: NURSE PRACTITIONER

## 2024-10-25 PROCEDURE — 124N000001 HC R&B MH

## 2024-10-25 RX ORDER — PANTOPRAZOLE SODIUM 40 MG/1
40 TABLET, DELAYED RELEASE ORAL DAILY PRN
Status: DISCONTINUED | OUTPATIENT
Start: 2024-10-25 | End: 2024-11-05 | Stop reason: HOSPADM

## 2024-10-25 RX ORDER — TOLTERODINE 4 MG/1
4 CAPSULE, EXTENDED RELEASE ORAL EVERY MORNING
Status: DISCONTINUED | OUTPATIENT
Start: 2024-10-25 | End: 2024-11-05 | Stop reason: HOSPADM

## 2024-10-25 RX ORDER — FLUTICASONE FUROATE AND VILANTEROL 100; 25 UG/1; UG/1
1 POWDER RESPIRATORY (INHALATION) EVERY MORNING
Status: DISCONTINUED | OUTPATIENT
Start: 2024-10-25 | End: 2024-11-05 | Stop reason: HOSPADM

## 2024-10-25 RX ORDER — MULTIPLE VITAMINS W/ MINERALS TAB 9MG-400MCG
1 TAB ORAL EVERY MORNING
Status: DISCONTINUED | OUTPATIENT
Start: 2024-10-25 | End: 2024-11-05 | Stop reason: HOSPADM

## 2024-10-25 RX ORDER — METOPROLOL SUCCINATE 25 MG/1
25 TABLET, EXTENDED RELEASE ORAL EVERY MORNING
Status: DISCONTINUED | OUTPATIENT
Start: 2024-10-25 | End: 2024-11-05 | Stop reason: HOSPADM

## 2024-10-25 RX ORDER — FUROSEMIDE 20 MG/1
40 TABLET ORAL EVERY MORNING
Status: DISCONTINUED | OUTPATIENT
Start: 2024-10-25 | End: 2024-11-05 | Stop reason: HOSPADM

## 2024-10-25 RX ORDER — GABAPENTIN 400 MG/1
400 CAPSULE ORAL 3 TIMES DAILY
Status: DISCONTINUED | OUTPATIENT
Start: 2024-10-25 | End: 2024-11-05 | Stop reason: HOSPADM

## 2024-10-25 RX ORDER — DOCUSATE SODIUM 100 MG/1
100 CAPSULE, LIQUID FILLED ORAL DAILY
Status: DISCONTINUED | OUTPATIENT
Start: 2024-10-25 | End: 2024-11-01

## 2024-10-25 RX ORDER — ROSUVASTATIN CALCIUM 10 MG/1
20 TABLET, COATED ORAL AT BEDTIME
Status: DISCONTINUED | OUTPATIENT
Start: 2024-10-25 | End: 2024-11-05 | Stop reason: HOSPADM

## 2024-10-25 RX ORDER — NITROGLYCERIN 0.4 MG/1
0.4 TABLET SUBLINGUAL EVERY 5 MIN PRN
Status: DISCONTINUED | OUTPATIENT
Start: 2024-10-25 | End: 2024-11-05 | Stop reason: HOSPADM

## 2024-10-25 RX ORDER — DULOXETIN HYDROCHLORIDE 30 MG/1
90 CAPSULE, DELAYED RELEASE ORAL EVERY MORNING
Status: DISCONTINUED | OUTPATIENT
Start: 2024-10-25 | End: 2024-10-29

## 2024-10-25 RX ORDER — ASPIRIN 81 MG/1
81 TABLET ORAL EVERY MORNING
Status: DISCONTINUED | OUTPATIENT
Start: 2024-10-25 | End: 2024-11-05 | Stop reason: HOSPADM

## 2024-10-25 RX ORDER — HYDROXYZINE HYDROCHLORIDE 25 MG/1
25 TABLET, FILM COATED ORAL 4 TIMES DAILY PRN
Status: DISCONTINUED | OUTPATIENT
Start: 2024-10-25 | End: 2024-11-05 | Stop reason: HOSPADM

## 2024-10-25 RX ORDER — LISINOPRIL 20 MG/1
20 TABLET ORAL EVERY MORNING
Status: DISCONTINUED | OUTPATIENT
Start: 2024-10-25 | End: 2024-11-05 | Stop reason: HOSPADM

## 2024-10-25 RX ORDER — ALBUTEROL SULFATE 90 UG/1
2 INHALANT RESPIRATORY (INHALATION) EVERY 4 HOURS PRN
Status: DISCONTINUED | OUTPATIENT
Start: 2024-10-25 | End: 2024-11-05 | Stop reason: HOSPADM

## 2024-10-25 RX ADMIN — HYDROXYZINE HYDROCHLORIDE 25 MG: 25 TABLET ORAL at 21:31

## 2024-10-25 RX ADMIN — FUROSEMIDE 40 MG: 20 TABLET ORAL at 10:47

## 2024-10-25 RX ADMIN — EMPAGLIFLOZIN 10 MG: 10 TABLET, FILM COATED ORAL at 10:48

## 2024-10-25 RX ADMIN — GABAPENTIN 400 MG: 400 CAPSULE ORAL at 20:07

## 2024-10-25 RX ADMIN — DULOXETINE HYDROCHLORIDE 90 MG: 30 CAPSULE, DELAYED RELEASE PELLETS ORAL at 10:47

## 2024-10-25 RX ADMIN — FLUTICASONE FUROATE AND VILANTEROL TRIFENATATE 1 PUFF: 100; 25 POWDER RESPIRATORY (INHALATION) at 11:03

## 2024-10-25 RX ADMIN — DOCUSATE SODIUM 100 MG: 100 CAPSULE, LIQUID FILLED ORAL at 10:48

## 2024-10-25 RX ADMIN — LISINOPRIL 20 MG: 20 TABLET ORAL at 10:48

## 2024-10-25 RX ADMIN — ALBUTEROL SULFATE 2 PUFF: 90 AEROSOL, METERED RESPIRATORY (INHALATION) at 12:42

## 2024-10-25 RX ADMIN — Medication 100 MG: at 10:47

## 2024-10-25 RX ADMIN — TOLTERODINE 4 MG: 4 CAPSULE, EXTENDED RELEASE ORAL at 10:47

## 2024-10-25 RX ADMIN — GABAPENTIN 100 MG: 100 CAPSULE ORAL at 03:24

## 2024-10-25 RX ADMIN — GABAPENTIN 400 MG: 400 CAPSULE ORAL at 12:43

## 2024-10-25 RX ADMIN — MELATONIN 3 MG: at 21:31

## 2024-10-25 RX ADMIN — ASPIRIN 81 MG: 81 TABLET, COATED ORAL at 10:48

## 2024-10-25 RX ADMIN — ROSUVASTATIN CALCIUM 20 MG: 10 TABLET, FILM COATED ORAL at 20:07

## 2024-10-25 RX ADMIN — Medication 1 TABLET: at 10:48

## 2024-10-25 RX ADMIN — METOPROLOL SUCCINATE 25 MG: 25 TABLET, EXTENDED RELEASE ORAL at 14:06

## 2024-10-25 ASSESSMENT — ACTIVITIES OF DAILY LIVING (ADL)
ADLS_ACUITY_SCORE: 0
ORAL_HYGIENE: INDEPENDENT
ADLS_ACUITY_SCORE: 0
DRESS: SCRUBS (BEHAVIORAL HEALTH)
ADLS_ACUITY_SCORE: 0
ADLS_ACUITY_SCORE: 0
LAUNDRY: UNABLE TO COMPLETE
ADLS_ACUITY_SCORE: 0
HYGIENE/GROOMING: INDEPENDENT
ADLS_ACUITY_SCORE: 0
ORAL_HYGIENE: INDEPENDENT
ADLS_ACUITY_SCORE: 0
DRESS: INDEPENDENT;SCRUBS (BEHAVIORAL HEALTH)
ADLS_ACUITY_SCORE: 0
LAUNDRY: UNABLE TO COMPLETE
ADLS_ACUITY_SCORE: 0
HYGIENE/GROOMING: INDEPENDENT

## 2024-10-25 NOTE — PROGRESS NOTES
10/24/24 2017   Patient Belongings   Patient Belongings remains with patient;locker;sent to security per site process;returned to patient at discharge   Patient Belongings Remaining with Patient vision aids   Patient Belongings Put in Hospital Secure Location (Security or Locker, etc.) cash/credit card;cell phone/electronics;clothing;watch;wallet;other (see comments);medication(s)   Belongings Search Yes   Clothing Search Yes   Second Staff Crystal   Comment plaid button up shirt, grey button up shirt, blue dress shirt, tan slacks, khaki shorts, 2 leather belts, 12 depends, white pair of socks, grey pair of socks, black shoes w/inserts, sandals, robe, 2 garbage bags, roll of paper towels, 3 compression sleeves, wind breaker, black suitcase     List items sent to safe: black iphone, cracked screen protector, black case, casio watch, [place] DL, sliding scale card, [employer] debit, [employer] CC, [employer] Visa CC, [facility] [employer] benefit rewards card, [facility] Visa Benefits rewards card, 2 [employer] ins card, Medicare card, 2  card, 3 [employer] cards, [facility] Clergy card, 7 rewards/membership cards, various receipts & business cards, copy of pass port, 6-$20s, 3-$1s($123 total), Checkbook and ledger, checks #[phone] to [phone], black wallet    All other belongings put in assigned cubby in belongings room.       I have reviewed my belongings list on admission and verify that it is correct.     Patient signature_______________________________    Second staff witness (if patient unable to sign) ______________________________       I have received all my belongings at discharge.    Patient signature________________________________    Jessie   10/24/2024  8:23 PM

## 2024-10-25 NOTE — H&P
"[facility] PSYCHIATRY   HISTORY AND PHYSICAL     ADMISSION DATA     Jeffrey King MRN# [id number]   Age: 74 year old YOB: 1950     Date of Admission: 10/24/2024  Primary Physician: No Ref-Primary, Physician        CHIEF COMPLAINT   \"Feeling anxious and depressed\"       HISTORY OF PRESENT ILLNESS     Per ED:  Jeffrey is a 74 year old male who presents to the [facility] ED for evaluation of increased anxiety and depression. Patient had tried to get himself admitted to [facility], however they were full. History of alcohol abuse and dependency, quit drinking 2 weeks ago. His PCP had given him a small amount of Ativan and this morning he felt so anxious that he took 3 tablets of 0.5 mg Ativan. It did not help with his anxiety or depressive symptoms and made him feel \"very tired\". Patient feeling like he wants to just lay down or die, he made a comment to crisis worker that he may just get a bottle and drink himself into oblivion, he does not want to feel like this anymore. He denies being actively suicidal or making attempt to harm self. Denies any overdose. Patient was tearful stating \"I cannot take it anymore, I think I am going to go crazy\".    Per DEC:  Jeffrey King presents to the ED per community partner(s). Patient is presenting to the ED for the following concerns: Depression, Suicidal ideation, Anxiety.   Factors that make the mental health crisis life threatening or complex are:  Jeffrey was brought to the ER by a MH Crisis responder from [facility].  Patient stated, \"I am a basket case, I can't stop crying, I need help:     Jeffrey reports increased depression and anxiety in the last two weeks.  He reports he tried to go to the [facility] crisis residence today but they did not have any beds.   Patient reports he awoke with anxieyt and he took 3 Ativan.  He denies this was an attempt to kill himself but stated, \"I just wanted to feel better\".  Patient denies suicidal " "thoughts or wishing he was dead, but stated, \"If I go home, I will drink myself to death\".  Patient reports history of self medicating with alcohol but states he hasn't had a drink in over 3 days.  Patient reports stressors include; not having a support system(all of family is dead), no purpose(retired ) and recent open heart surgery.   He cried as he  said, \"I am so alone\".    Per patient:  Patient reported that he came in to the hospital because he was feeling \"anxious and depressed\". He notes that yesterday morning he woke up and was experiencing a lot of anxiety, he notes that he took 1 Ativan tablet, then took another, and then a third as he was hoping that this would help his anxiety. He denies that this was an overdose attempt or that he was actively feeling suicidal. He notes that after he did this he started to feel \"tired and weak\". He had initially wanted to go to [facility] as he reports having good experiences there in the past, though found out that they were full. He also notes that his insurance is not accepted there, so he would only be able to spend 3 days there \"on a voucher\". He states that he decided to come to the hospital at that point. He states that yesterday he was feeling weak, dizzy, and nauseated. He reports that he still sort of feels this way today, though did wake up feeling better this morning. He denies any active suicidal thoughts today.     Patient reports struggling with anxiety and depression for a long time. Does have history of hospitalizations many years ago, patient believes 20+ years ago. 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 Patient reports taking Cymbalta for a while, felt that this had been helping his depression and anxiety. States that after he was hospitalized this past July for CABG x4, he initially felt good going home, then started to notice his anxiety and depression getting worse. He reports that the past few days he " "wakes up more anxious. Patient reports feeling that part of this is due to feeling very lonely and alone at home. He notes that he just sits at home. Used to enjoy painting though does not do this anymore. Has struggled more since retiring in 2018 as a . Has very little structure to his day, \"I don't have a purpose anymore\". Does report most of his family members are , both parents and his brother. He is not  and has no children.     Patient is in agreement to continue PTA medications. He notes that he just recently is getting reconnected with [facility] for therapy, could benefit from psychiatry as well if available. Is interested in any other services they might provide such as [facility] or case management. He does want to look into volunteer options in his area, as he feels this would help him to meet more people. He also states that he is willing to consider looking into assisted living options, though states he does not think he can afford it. He does currently own his own home though states he is willing to sell it. He notes that he will start looking into this more seriously when he returns home, as he does feel that the structure and community provided in as assisted living setting would be beneficial.        PSYCHIATRIC HISTORY     Has been hospitalized 10+ years ago at [facility] for depression, anxiety. Does have history of ECT in [place] in , also had in the s. Currently no psychiatry, does report just recently starting to see a therapist at [facility]. Has been to [facility] for crisis stabilization in the past. Past medications include Wellbutrin, Abilify, Lexapro, Vistaril, Naltrexone, Campral, Zoloft, Tranxene, Seroquel, Geodon, Buspar, Lamictal, Klonopin, Celexa, Ativan, Cymbalta, Gabapentin, Naltrexone, Melatonin, as well as likely others.          SUBSTANCE USE HISTORY   History   Drug Use Unknown       Social History    Substance and Sexual Activity      " Alcohol use: Yes        Alcohol/week: 105.0 standard drinks of alcohol      History   Smoking Status    Former    Packs/day: 0.25    Years: 15.00    Quit date: 2015   Smokeless Tobacco    Never     Denies any history of drug use.   Has a long history of alcohol abuse. Per patient he has been to CD treatment x 6, has been to detox several times including last time on 10/16/24. States last drink was about a week ago. BAL in ED was negative.        SOCIAL HISTORY   Social History     Socioeconomic History    Marital status: Single     Spouse name: Not on file    Number of children: Not on file    Years of education: Not on file    Highest education level: Not on file   Occupational History    Not on file   Tobacco Use    Smoking status: Former     Current packs/day: 0.00     Average packs/day: 0.3 packs/day for 15.0 years (3.8 ttl pk-yrs)     Types: Cigarettes     Start date: 2000     Quit date: 2015     Years since quittin.1    Smokeless tobacco: Never   Substance and Sexual Activity    Alcohol use: Yes     Alcohol/week: 105.0 standard drinks of alcohol    Drug use: Never    Sexual activity: Not Currently     Partners: Female   Other Topics Concern    Not on file   Social History Narrative    Not on file     Social Drivers of Health     Financial Resource Strain: Low Risk  (10/24/2024)    Financial Resource Strain     Within the past 12 months, have you or your family members you live with been unable to get utilities (heat, electricity) when it was really needed?: No   Food Insecurity: Low Risk  (10/24/2024)    Food Insecurity     Within the past 12 months, did you worry that your food would run out before you got money to buy more?: No     Within the past 12 months, did the food you bought just not last and you didn t have money to get more?: No   Transportation Needs: Low Risk  (10/24/2024)    Transportation Needs     Within the past 12 months, has lack of transportation kept you from medical  appointments, getting your medicines, non-medical meetings or appointments, work, or from getting things that you need?: No   Physical Activity: Not on file   Stress: Not on file   Social Connections: Not on file   Interpersonal Safety: High Risk (10/24/2024)    Interpersonal Safety     Do you feel physically and emotionally safe where you currently live?: No     Within the past 12 months, have you been hit, slapped, kicked or otherwise physically hurt by someone?: No     Within the past 12 months, have you been humiliated or emotionally abused in other ways by your partner or ex-partner?: No   Housing Stability: Low Risk  (10/24/2024)    Housing Stability     Do you have housing? : Yes     Are you worried about losing your housing?: No     Grew up in [place] with both parents and brother. Majored in Islam in college. Did become a , retiring in 2018. Has lived in [place], [place] past [place]. Lives alone, single, no children. Reports that both parents and brother are , has one half-sister in hospice. Denies any history of abuse. Denies any legal issues.        FAMILY HISTORY   History reviewed. No pertinent family history.   Reports both parents with depression/anxiety.     PAST MEDICAL HISTORY   History reviewed. No pertinent past medical history.    History reviewed. No pertinent surgical history.    Patient has no known allergies.     MEDICATIONS   Prior to Admission medications    Medication Sig Start Date End Date Taking? Authorizing Provider   aspirin 81 MG EC tablet Take 81 mg by mouth every morning.   Yes Reported, Patient   Docusate Calcium (STOOL SOFTENER PO) Take 1 capsule by mouth daily.   Yes Reported, Patient   DULoxetine (CYMBALTA) 30 MG capsule Take 90 mg by mouth every morning. Do not crush. 24  Yes Reported, Patient   empagliflozin (JARDIANCE) 10 MG TABS tablet Take 10 mg by mouth every morning. Do not chew or crush. 24  Yes Reported, Patient    fluticasone-vilanterol (BREO ELLIPTA) 100-25 MCG/ACT inhaler Inhale 1 puff into the lungs every morning. Rinse mouth after use. 5/20/24  Yes Reported, Patient   furosemide (LASIX) 40 MG tablet Take 1 tablet by mouth every morning. 7/29/24  Yes Reported, Patient   gabapentin (NEURONTIN) 400 MG capsule Take 1 capsule by mouth 3 times daily. 9/26/24  Yes Reported, Patient   hydrOXYzine HCl (ATARAX) 50 MG tablet Take 50 mg by mouth 4 times daily as needed for anxiety. 9/25/24  Yes Reported, Patient   lisinopril (ZESTRIL) 20 MG tablet Take 1 tablet by mouth every morning. 5/1/24  Yes Reported, Patient   LORazepam (ATIVAN) 0.5 MG tablet Take 0.5 mg by mouth 3 times daily as needed for anxiety. 10/10/24  Yes Reported, Patient   melatonin 3 MG tablet Take 3 mg by mouth at bedtime.   Yes Reported, Patient   METOPROLOL SUCCINATE ER PO Take 1 tablet by mouth every morning. Do not crush or chew. 7/11/24  Yes Reported, Patient   multivitamin, therapeutic (THERA-VIT) TABS tablet Take 1 tablet by mouth every morning.   Yes Reported, Patient   nitroGLYcerin (NITROSTAT) 0.4 MG sublingual tablet Place 0.4 mg under the tongue every 5 minutes as needed for chest pain. Do not crush; maximum of 3 doses in 15 minutes. 5/30/24  Yes Reported, Patient   OMEGA 3-6-9 FATTY ACIDS PO Take 2 capsules by mouth 2 times daily.   Yes Reported, Patient   omeprazole (PRILOSEC) 20 MG DR capsule Take 20 mg by mouth daily as needed (heartburn). Take before meals. Do not crush. 5/1/24  Yes Reported, Patient   ROSUVASTATIN CALCIUM PO Take 1 tablet by mouth at bedtime.   Yes Reported, Patient   thiamine (B-1) 100 MG tablet Take 100 mg by mouth every morning.   Yes Reported, Patient   tolterodine ER (DETROL LA) 4 MG 24 hr capsule Take 4 mg by mouth every morning. 10/1/24  Yes Reported, Patient   Vitamin D3 (VITAMIN D, CHOLECALCIFEROL,) 25 mcg (1000 units) tablet Take 25 mcg by mouth daily.   Yes Reported, Patient   naltrexone (DEPADE/REVIA) 50 MG tablet  Take 50 mg by mouth daily.  Patient not taking: Reported on 10/24/2024    Reported, Patient   senna-docusate (SENOKOT-S/PERICOLACE) 8.6-50 MG tablet Take 2 tablets by mouth 2 times daily as needed for constipation.  Patient not taking: Reported on 10/24/2024    Reported, Patient        PHYSICAL EXAM/ROS     I have reviewed the physical exam as documented by Fatmata Wilcox CNP and agree with findings and assessment and have no additional findings to add at this time. The review of systems is negative other than noted in the HPI.       LABS   Recent Results (from the past 24 hours)   INR    Collection Time: 10/24/24  2:21 PM   Result Value Ref Range    INR 1.02 0.85 - 1.15   Comprehensive metabolic panel    Collection Time: 10/24/24  2:21 PM   Result Value Ref Range    Sodium 136 135 - 145 mmol/L    Potassium 4.0 3.4 - 5.3 mmol/L    Carbon Dioxide (CO2) 21 (L) 22 - 29 mmol/L    Anion Gap 14 7 - 15 mmol/L    Urea Nitrogen 18.4 8.0 - 23.0 mg/dL    Creatinine 1.12 0.67 - 1.17 mg/dL    GFR Estimate 69 >60 mL/min/1.73m2    Calcium 9.3 8.8 - 10.4 mg/dL    Chloride 101 98 - 107 mmol/L    Glucose 111 (H) 70 - 99 mg/dL    Alkaline Phosphatase 72 40 - 150 U/L    AST 38 0 - 45 U/L    ALT 72 (H) 0 - 70 U/L    Protein Total 6.8 6.4 - 8.3 g/dL    Albumin 4.3 3.5 - 5.2 g/dL    Bilirubin Total 0.5 <=1.2 mg/dL   Lipase    Collection Time: 10/24/24  2:21 PM   Result Value Ref Range    Lipase 25 13 - 60 U/L   Magnesium    Collection Time: 10/24/24  2:21 PM   Result Value Ref Range    Magnesium 2.3 1.7 - 2.3 mg/dL   TSH with free T4 reflex    Collection Time: 10/24/24  2:21 PM   Result Value Ref Range    TSH 3.08 0.30 - 4.20 uIU/mL   Ethyl Alcohol Level    Collection Time: 10/24/24  2:21 PM   Result Value Ref Range    Alcohol ethyl <0.01 <=0.01 g/dL   Bilirubin direct    Collection Time: 10/24/24  2:21 PM   Result Value Ref Range    Bilirubin Direct <0.20 0.00 - 0.30 mg/dL   CBC with platelets and differential    Collection Time: 10/24/24   2:21 PM   Result Value Ref Range    WBC Count 5.8 4.0 - 11.0 10e3/uL    RBC Count 4.73 4.40 - 5.90 10e6/uL    Hemoglobin 14.5 13.3 - 17.7 g/dL    Hematocrit 42.4 40.0 - 53.0 %    MCV 90 78 - 100 fL    MCH 30.7 26.5 - 33.0 pg    MCHC 34.2 31.5 - 36.5 g/dL    RDW 13.2 10.0 - 15.0 %    Platelet Count 233 150 - 450 10e3/uL    % Neutrophils 59 %    % Lymphocytes 19 %    % Monocytes 19 %    % Eosinophils 2 %    % Basophils 0 %    % Immature Granulocytes 0 %    NRBCs per 100 WBC 0 <1 /100    Absolute Neutrophils 3.5 1.6 - 8.3 10e3/uL    Absolute Lymphocytes 1.1 0.8 - 5.3 10e3/uL    Absolute Monocytes 1.1 0.0 - 1.3 10e3/uL    Absolute Eosinophils 0.1 0.0 - 0.7 10e3/uL    Absolute Basophils 0.0 0.0 - 0.2 10e3/uL    Absolute Immature Granulocytes 0.0 <=0.4 10e3/uL    Absolute NRBCs 0.0 10e3/uL   Extra Red Top Tube    Collection Time: 10/24/24  2:21 PM   Result Value Ref Range    Hold Specimen JIC    Troponin T, High Sensitivity    Collection Time: 10/24/24  2:21 PM   Result Value Ref Range    Troponin T, High Sensitivity 21 <=22 ng/L   EKG 12-lead, tracing only    Collection Time: 10/24/24  2:49 PM   Result Value Ref Range    Systolic Blood Pressure  mmHg    Diastolic Blood Pressure  mmHg    Ventricular Rate 67 BPM    Atrial Rate 67 BPM    DC Interval 162 ms    QRS Duration 108 ms     ms    QTc 435 ms    P Axis 63 degrees    R AXIS 66 degrees    T Axis 72 degrees    Interpretation ECG       Sinus rhythm  Incomplete right bundle branch block  Nonspecific T wave abnormality  Abnormal ECG  No previous ECGs available  Confirmed by MD Evelyn, Evgeny ([id number]) on 10/25/2024 7:09:59 AM     UA with Microscopic reflex to Culture    Collection Time: 10/24/24  3:34 PM    Specimen: Urine, Midstream   Result Value Ref Range    Color Urine Light Yellow Colorless, Straw, Light Yellow, Yellow    Appearance Urine Clear Clear    Glucose Urine 1000 (A) Negative mg/dL    Bilirubin Urine Negative Negative    Ketones Urine Negative  Negative mg/dL    Specific Gravity Urine 1.020 1.003 - 1.035    Blood Urine Negative Negative    pH Urine 7.0 4.7 - 8.0    Protein Albumin Urine Negative Negative mg/dL    Urobilinogen Urine Normal Normal, 2.0 mg/dL    Nitrite Urine Negative Negative    Leukocyte Esterase Urine Negative Negative    Mucus Urine Present (A) None Seen /LPF    RBC Urine <1 <=2 /HPF    WBC Urine 3 <=5 /HPF    Squamous Epithelials Urine 0 <=1 /HPF   Urine Drug Screen Panel    Collection Time: 10/24/24  3:34 PM   Result Value Ref Range    Amphetamines Urine Screen Negative Screen Negative    Barbituates Urine Screen Negative Screen Negative    Benzodiazepine Urine Screen Positive (A) Screen Negative    Cannabinoids Urine Screen Negative Screen Negative    Cocaine Urine Screen Negative Screen Negative    Fentanyl Qual Urine Screen Negative Screen Negative    Opiates Urine Screen Negative Screen Negative    PCP Urine Screen Negative Screen Negative         MENTAL STATUS EXAM   Vitals: /74   Pulse 67   Temp 97  F (36.1  C) (Temporal)   Resp 18   SpO2 97%     Appearance:  awake, alert, adequately groomed, dressed in hospital scrubs, and appeared as age stated  Attitude:  cooperative  Eye Contact:  good  Mood:  anxious and depressed  Affect:  mood congruent  Speech:  clear, coherent  Psychomotor Behavior:  no evidence of tardive dyskinesia, dystonia, or tics  Thought Process:  linear  Associations:  no loose associations  Thought Content:  no evidence of suicidal ideation or homicidal ideation and no evidence of psychotic thought  Insight:  fair  Judgment:  fair  Oriented to:  time, person, and place  Attention Span and Concentration:  fair  Recent and Remote Memory:  intact  Fund of Knowledge: appropriate  Muscle Strength and Tone: normal  Gait and Station: not observed, patient in bed       ASSESSMENT     This is a 74 year old male with a PMH of MDD, CHELSIE, and alcohol abuse who presents to the ED for evaluation of anxiety and  "depression. Patient reported taking three tablets of his Ativan this morning in an attempt to help his anxiety, though started to feel tired and weak. Tried to get into [facility] though they were full. Today patient denies any SI. He reports feeling more depressed and anxiety over past few weeks, more so the past few days. He notes that he feels very isolated and lonely since retiring from work as a . He has no family or children. He reports feeling he has \"no purpose\". Patient notes that he is interested in increasing his mental health services and looking into volunteer options. He has longer term plan of getting into an assisted living. Is in agreement to continue Cymbalta, Melatonin, Gabapentin. He does not want to continue Ativan and is aware of the risks in taking this medication given his age and his ongoing alcohol use. He was not taking Naltrexone at home, though would be interested in restarting this prior to discharge. Patient is in agreement to voluntary admission for stabilization. He notes that he may be interested in stepping down from the hospital to [facility] prior to return home, can look into this closer to discharge.        DIAGNOSIS     #. Major depressive disorder, recurrent, severe  #. Generalized anxiety disorder  #. Alcohol use disorder, severe       PLAN     Location: Unit 5  Legal Status: Orders Placed This Encounter      Voluntary    Safety Assessment:    Behavioral Orders   Procedures    Code 1 - Restrict to Unit    Routine Programming     As clinically indicated    Status 15     Every 15 minutes.    Suicide precautions: Suicide Risk: LOW     Consider searching patient belongings according to policy for contraband or items that could be used for self-harm.     Order Specific Question:   Suicide Risk     Answer:   LOW      PTA psychotropic medications held:     -Lorazepam 0.5 mg TID prn anxiety- stopped on admission  -Naltrexone 50 mg daily- pt not taking upon admission. Can explore " restarting this later, pt not feeling well today    PTA psychotropic medications continued/changed:     -Cymbalta 90 mg daily  -Gabapentin 400 mg TID  -Hydroxyzine 50 mg QID prn anxiety  -Melatonin 3 mg at bedtime    New medications initiated:     -standard unit prn medications    Programming: Patient will be treated in a therapeutic milieu with appropriate individual and group therapies. Education will be provided on diagnoses, medications, and treatments.     Medical diagnoses:  Per medicine    Consult: none  Tests: none    Anticipated LOS: 3-5 days  Disposition: home with services    Justification for hospitalization: reasons for hospitalization include potential safety risk to self or others within the last week, decreased functioning in outpatient setting and in the setting of no outpatient management, need for highly structured inpatient management for stabilization of psychiatric symptoms, need for psychiatric medication initiation and stabilization.       ATTESTATION      Gunjan Lucas NP

## 2024-10-25 NOTE — PLAN OF CARE
"Social Service Psychosocial Assessment    Presenting Problem: Pt admitted with increased depression and feeling worthlessness.     Marital Status: Single    Spouse / Children: No children    Psychiatric TX HX: This is pt first time on the unit, pt has hx of IP psychiatric hospitalizations for depression at [facility]. States it was many years ago. Pt has also been to do [facility].    Suicide Risk Assessment: Pt not admitted with SI, pt has hx of SI, pt denies SI today.     Access to Lethal Means (explain): Pt denies    Family Psych HX: \"I believe my mom and dad had anxiety\"      A & Ox: x4      Medication Adherence: See H&P    Medical Issues: See H&P      Visual -Motor Functioning: Good    Communication Skills /Needs: Good    Ethnicity: White      Spirituality/Yarsani Affiliation: \"No I don't really consider myself Mormon, I think Presybeterian and spiritually are two different things\"      Clergy Request: No      History: None reported      Living Situation: Owns a home and lives with a roommate     ADL s: Independent       Education: Graduated HS, attended law school    Financial Situation: SSI    Occupation: Retired in 2018 was a      Leisure & Recreation: \"I like to paint, I have a painting studio up in my room above garage but I moved everything down into my living room so I could still do it. Lately I havent been, my paint brushes are dry\"    Childhood History: Grew up in [place] with mom and dad and 1 brother. Brother and mother passed away     Trauma Abuse HX: Pt denies    Relationship / Sexuality:     Substance Use/ Abuse: Pt denies drug use. Pt has hx of alcohol abuse, last used about 4 days ago.     Chemical Dependency Treatment HX: \"I have been to about 6 IP CD treatments. I did a couple in [facility] because it was cheaper\"     Legal Issues: Pt denies    Significant Life Events: mother and brother passed away    Strengths: Ability to communicate needs, in a " safe environment, has insurance    Challenges /Limitation: Poor coping skills, current mental health symptoms, lack of service     Patient Support Contact (Include name, relationship, number, and summary of conversation): Pt has Reji signed for Ayde Giles, and Tee Haney tenant/ friend. No numbers listed.      Interventions:         Community-Based Programs- would benefit    Medical/Dental Care- Dr. Ahumada- [facility]- would benefit    CD Evaluation/Rule 25/Aftercare- would benefit    Medication Management- would benefit    Individual Therapy- would benefit, believes he sees someone at [facility].     Insurance Coverage- [facility]/[employer]ICARE     Suicide Risk Assessment-  Pt not admitted with SI, pt has hx of SI, pt denies SI today.     High Risk Safety Plan- Talk to supports; Call crisis lines; Go to local ER if feeling suicidal.    TC Smith  10/25/2024  8:24 AM

## 2024-10-25 NOTE — PLAN OF CARE
"  Problem: Adult Behavioral Health Plan of Care  Goal: Patient-Specific Goal (Individualization)  Description: Patient will sleep 6 to 8 hours per night  Patient will eat at least 50% of meals  Patient will attend at least 50% of groups  Patient will comply with recommendations of treatment team  Patient will remain medication compliant  Outcome: Progressing     Problem: Depression  Goal: Decreased Depression Symptoms  Description: Patient will verbalize a decrease in depression, and increase in mood by discharge.  Outcome: Progressing     Problem: Suicide Risk  Goal: Absence of Self-Harm  Outcome: Progressing   Goal Outcome Evaluation:    Plan of Care Reviewed With: patient          Patient spends time in his room lying on his bed. He is pleasant and cooperative, he laughs and jokes with staff. He admits to anxiety, states he is concerned about his blood pressure being lower at 108/63, but he realizes \"maybe I'm just more relaxed here\". States he feels dizzy, lightheaded, and a bit nauseated at times, he has been steady on his feet and was able to eat dinner and snack with no issues. He asks for his compression socks, but they were still wet, states \"that's okay, my feet are up now, anyway, I can wait until tomorrow\". States he wants to attend groups this evening, staff will let him know when groups are going on.   2131-accepted Atarax 25 mg and Melatonin 3 mg for anxiety related to sleep, states he has a difficult time shutting his brain down to sleep. He was shown the book shelf to pick a book to read.   Face to face end of shift report communicated to oncoming RN.     Nola Alvarez RN  10/25/2024  10:18 PM               "

## 2024-10-25 NOTE — CONSULTS
DEC Consult Order placed. DEC assessment completed by Luisa Washington on 10.24.24 at 6:53PM. Consult acknowledged and completed.     Leonidas Hidalgo

## 2024-10-25 NOTE — PLAN OF CARE
"  Problem: Adult Behavioral Health Plan of Care  Goal: Patient-Specific Goal (Individualization)  Description: Patient will sleep 6 to 8 hours per night  Patient will eat at least 50% of meals  Patient will attend at least 50% of groups  Patient will comply with recommendations of treatment team  Patient will remain medication compliant      Outcome: Progressing  Note: Report received from Harriet. Rounding complete. Pt observed sleeping in right side lying position with regular and unlabored respirations.    0315- Pt c/o SOB and feeling \"stuffy\". Writer went assessed pt lung and heart sounds. Pt has very mild coarse crackles in his upper lobe of his right lung with the rest of his left and right lung sounding clear to auscultation. He also has what sounded like a mild tricuspid murmur but was difficult to confirm due to the crackles in his upper right lung and some mild wheezes in his throat. Pt stated that he had a quadruple bypass in July. He did also state that his upper lobe of his right lung has been \"sketchy for some years now.\" Pt did endorse anxiety and was offered and accepted gabapentin 100 mg at 0324. Pt was asked if he felt his current symptoms felt severe enough to have any major concerns right now and he stated he did not. Writer did offer to call the provider for a respiratory consult and pt stated he did not think that was necessary at this time but agreed to let this writer know if symptoms worsened at all or became concerning. Pt is very pleasant and cooperative with all assessments and questions. He is very forthcoming with information. Pt was also asked if he has had any known recent exposure to covid or influenza to which he stated he was not sure.     0445- Pt c/o of being SOB again but declining any interventions or need for further assessment. Pt stated he still feels like he needs to take deeper breaths. Writer did mention that I saw that he has a dx of SAMMY and is supposed to be using a " CPAP. Pt stated he quit using it approx 3 months ago and does not have it with him. He stated he doesn't feel he needs it. Pt was provided a bed wedge to facilitate ease of breathing. Will send sticky note to provider regarding this pt c/o SOB and feeling like his breathing is somewhat labored. Pt states he does not know if this is just anxiety or if he is possible fixating on his breathing. Pt did also state that he feels it is possible that he is coming down with something, but he is not sure.    Pt has been in bed with eyes closed and regular respirations. 15 minute and PRN checks all night. No complaints offered.     Pt slept approx  6.75  hours this NOC shift.    Face to face end of shift report communicated to oncoming RN.    Leah MARIE RN  October 25, 2024  2:51 AM          Problem: Depression  Goal: Decreased Depression Symptoms  Description: Patient will verbalize a decrease in depression, and increase in mood by discharge.  Outcome: Progressing  Note: Unable to assess due to pt sleeping. No issues or concerns noted at this time.

## 2024-10-25 NOTE — DISCHARGE INSTRUCTIONS
Behavioral Discharge Planning and Instructions    Summary: This is a 74 year old male with a PMH of MDD, CHELSIE, and alcohol abuse who presents to the ED for evaluation of anxiety and depression.     Main Diagnosis:     #. Major depressive disorder, recurrent, severe  #. Generalized anxiety disorder  #. Alcohol use disorder, severe    Health Care Follow-up:     [facility]   [address] [place], [place] [address]  [phone]  Therapy: Flora Hernández- November 12th @ 10:00am.     [facility]  [address] [place] [address]  [phone]  Psychiatry: Aleisha Mohan- November 26th @ 1:00pm     They will be able to determine if you need case management once you have attended an appointment.     [facility]  [address] [place] [address]  [phone]  PCP: Dr. Veliz- November 8th @ 9:25am    PCA resources:     [address] [address] [phone]    [facility]   [phone]  They can provide you with a [place] assessment to get you PCA services.     [facility] [place].   [phone]    Attend all scheduled appointments with your outpatient providers. Call at least 24 hours in advance if you need to reschedule an appointment to ensure continued access to your outpatient providers.     Chemical Dependency Referrals: Referrals were sent to the following programs:     [facility]  [address] [place] [address]  [phone]    [facility]  [address] [place] [address]  [phone]    Contact the programs above to inquire about the status of the referral and possible admission.        Alcoholics Anonymous Meetings in [place]:     7:00 pm Friday  [facility]   [facility]   [address] [place] [address]    7:30 pm Saturday  [place] Saturday Group   [facility]   [address] [place]  [address]    7:00 pm Monday  [place] [facility] [address] [facility]   [facility]   [address] [place] [address]    7:00 pm Tuesday  [facility]   [facility]   [address] [place] [address]    7:30 pm Wednesday  [place] Wednesday Nite Group   [facility]   [address] [place] [address]    Additional meetings in [place] can be found online at www.aam[place].org    Additional Chemical Dependency Treatment Resources:    [facility]  [address] [place] [address]  [phone]    [facility]  www.[employer]  [phone]    [place] [facility]  [address] [place] [address]  [phone]    [facility]  [address] [place] [address]  [phone]      Major Treatments, Procedures and Findings:  You were provided with: a psychiatric assessment, assessed for medical stability, medication evaluation and/or management, group therapy, family therapy, individual therapy, CD evaluation/assessment, milieu management, and medical interventions.    Symptoms to Report: feeling more aggressive, increased confusion, losing more sleep, mood getting worse, or thoughts of suicide    Early warning signs can include: increased depression or anxiety sleep disturbances increased thoughts or behaviors of suicide or self-harm  increased unusual thinking, such as paranoia or hearing voices    Safety and Wellness:  Take all medicines as directed.  Make no changes unless your doctor suggests them.      Follow treatment recommendations.  Refrain from alcohol and non-prescribed drugs.  Ask your support system to help you reduce your access to items that could harm yourself or others. Items could include:  Firearms  Medicines (both prescribed and over-the-counter)  Knives and other sharp objects  Ropes and like materials  Car keys  If there is a concern for safety, call 911. If there is a concern for  "safety, call 911.    Resources:   Crisis Intervention: 720.735.6289 or 145-418-0594 (TTY: 568.141.2391).  Call anytime for help.  National [place] on Mental Illness (www.[place].steffany.org): 873.956.4398 or 252-603-6806.  [place] Association for Children's Mental Health (www.mac.org): 368.599.9225.  Alcoholics Anonymous (www.aa.org): Check your phone book for your local chapter.  Alcoholics Anonymous [place] (www.a[place].org): Click on \"meetings\" to search for a meeting location.  Suicide Awareness Voices of Education (SAVE) (www.save.org): 359-542-ANIY (8342)  National Suicide Prevention Line (www.mentalhealthmn.org): 291-613-WDLV (3401)  Mental Health Consumer/Survivor Network of [place] (www.mhcsn.net): 259.110.4704 or 554-909-1389  Mental Health Association of [place] (www.mentalhealth.org): 411.194.9343 or 276-014-1612  Self- Management and Recovery Training., Planning Media-- Toll free: 716.521.7846  www.[employer]  Text 4 Life: txt \"LIFE\" to 60506 for immediate support and crisis intervention  Crisis text line: Text \"[place]\" to 831404. Free, confidential, 24/7.  Crisis Intervention: 158.201.8854 or 708-693-6170. Call anytime for help.     General Medication Instructions:   See your medication sheet(s) for instructions.   Take all medicines as directed.  Make no changes unless your doctor suggests them.   Go to all your doctor visits.  Be sure to have all your required lab tests. This way, your medicines can be refilled on time.  Do not use any drugs not prescribed by your doctor.  Avoid alcohol.    Advance Directives:   Scanned document on file with [facility]? No scanned doc  Is document scanned? Pt states no documents  Honoring Choices Your Rights Handout: Informed and given  Was more information offered? Pt declined    The Treatment team has appreciated the opportunity to work with you. If you have any questions or concerns about your recent admission, you can contact the unit which can receive your call 24 hours a day, 7 days " a week. They will be able to get in touch with a Provider if needed. The unit number is [phone] .

## 2024-10-25 NOTE — PLAN OF CARE
Face to face end of shift report received from Leah SOTELO RN. Rounding completed. Patient observed in bed, awake.     Pt has been calm, cooperative this shift. He reports that he feels very tired and run down today. He talked a lot of how he needs a  and socialization in his life. States that ever since he retired he has felt worthless. He denied any SI/HI and AH/VH. Denied pain. [facility]. He has not attended any groups though he states he wants to he is just too tired. He reports feeling weak, but steady on his feet. Orthostatics done- medical NP updated. Pt showered this shift- denied need for shower chair. Pt SOB after ambulation- pt utilized PRN Albuterol and pursed lip breathing. He took all medications as prescribed. Steady gait- no falls. He is able to make his needs known. Frequent rounding.     Problem: Adult Behavioral Health Plan of Care  Goal: Patient-Specific Goal (Individualization)  Description: Patient will sleep 6 to 8 hours per night  Patient will eat at least 50% of meals  Patient will attend at least 50% of groups  Patient will comply with recommendations of treatment team  Patient will remain medication compliant      10/25/2024 1353 by Maxine Miramontes RN  Outcome: Progressing     Problem: Depression  Goal: Decreased Depression Symptoms  Description: Patient will verbalize a decrease in depression, and increase in mood by discharge.  10/25/2024 1353 by Maxine Miramontes RN  Outcome: Progressing       Maxine Miramontes RN  10/25/2024  2:02 PM

## 2024-10-25 NOTE — H&P
[facility]    History and Physical  Medical Services       Date of Admission:  10/24/2024  Date of Service (when I saw the patient): 10/25/24    Assessment & Plan     Principal Problem:    Depression with anxiety    Active Medical Problems:    Essential hypertension- vitals stable. Denies chest pain. Reports chronic SOB, hx of COPD. Not being using his maintenance inhaler.  Home dose of lisinopril  and metoprolol ordered.       COPD (chronic obstructive pulmonary disease) (H)-denies chest pain. Reports chronic SOB. He reports yesterday he was very SOB but states he feels much better this morning. States that especially after using his Breo inhaler today he feels much better. Albuterol inhaler order as needed. Lungs are clear bilaterally. Nursing to continue to monitor.       Esophageal reflux- denies GERD symptoms. Takes omeprazole as needed. Formulary substitute protonix ordered.       Hyperlipidemia- home dose of crestor ordered.       Obstructive sleep apnea- repots he is suppose to use a cpap and stopped using it a few months ago. He may use a bed wedge.       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 Denies chest pain, reports chronic sob. Home dose of aspirin, metoprolol, lisinopril, crestor, jardiance, nitroglycerin ordered.     Chronic lower extremity edema- 1-2 + bilateral lower extremity edema, hx of heart failure, CABG x 4. He uses ANGY hose and requests to use his home ones because they are more comfortable and fit him well. Nursing did wash these and order placed for him to use from 7am-7pm. Nursing staff will assist with applying and removing. Home dose of lasix ordered.       Code Status: Full Code    Fatmata Wilcox CNP    Primary Care Physician   Physician No Ref-Primary    Chief Complaint   Psych evaluation     History is obtained from the patient and electronic health record    History of Present Illness   (Per ED) Jeffrey King is a 74 year old male who presents to the ED on his own  "accord with crisis staff who was contacted by the patient.  Patient had attempted to get himself admitted to the [facility] but they were full.  He has a history of alcohol abuse and dependency and quit drinking over 2 weeks ago.  Since that time he has had increased anxiety.  His PCP had given him a small amount of Ativan and this morning he was feeling so anxious that he took 3 tablets of Ativan 0.5 mg.  It did not help with anxiety or depressive symptoms made him feel \"very tired\".  Patient is feeling like he wants to just lay down or die, he made a comment to the crisis worker that he may be out of get a bottle of bruising just drink himself into oblivion, he does not want to feel like this anymore.  He denies being actively suicidal or making any attempt to harm himself.  Denies any overdose.  Patient denies any homicidal ideation, no hallucination.  Patient was tearful stating \"I cannot take it anymore, I think I am going to go crazy.\"     Past Medical History    I have reviewed this patient's medical history and updated it with pertinent information if needed.   History reviewed. No pertinent past medical history.    Past Surgical History   I have reviewed this patient's surgical history and updated it with pertinent information if needed.  History reviewed. No pertinent surgical history.    Prior to Admission Medications   Prior to Admission Medications   Prescriptions Last Dose Informant Patient Reported? Taking?   DULoxetine (CYMBALTA) 30 MG capsule 10/24/2024 at  8:00 AM  Yes Yes   Sig: Take 90 mg by mouth every morning. Do not crush.   Docusate Calcium (STOOL SOFTENER PO) 10/24/2024  Yes Yes   Sig: Take 1 capsule by mouth daily.   LORazepam (ATIVAN) 0.5 MG tablet 10/24/2024 Morning  Yes Yes   Sig: Take 0.5 mg by mouth 3 times daily as needed for anxiety.   METOPROLOL SUCCINATE ER PO 10/24/2024 Morning  Yes Yes   Sig: Take 1 tablet by mouth every morning. Do not crush or chew.   OMEGA 3-6-9 FATTY " ACIDS PO 10/24/2024 Morning  Yes Yes   Sig: Take 2 capsules by mouth 2 times daily.   ROSUVASTATIN CALCIUM PO 10/23/2024  Yes Yes   Sig: Take 1 tablet by mouth at bedtime.   Vitamin D3 (VITAMIN D, CHOLECALCIFEROL,) 25 mcg (1000 units) tablet Unknown  Yes Yes   Sig: Take 25 mcg by mouth daily.   aspirin 81 MG EC tablet 10/24/2024  Yes Yes   Sig: Take 81 mg by mouth every morning.   empagliflozin (JARDIANCE) 10 MG TABS tablet 10/24/2024 at  8:00 AM  Yes Yes   Sig: Take 10 mg by mouth every morning. Do not chew or crush.   fluticasone-vilanterol (BREO ELLIPTA) 100-25 MCG/ACT inhaler 10/24/2024 at  8:00 AM  Yes Yes   Sig: Inhale 1 puff into the lungs every morning. Rinse mouth after use.   furosemide (LASIX) 40 MG tablet 10/24/2024 Morning  Yes Yes   Sig: Take 1 tablet by mouth every morning.   gabapentin (NEURONTIN) 400 MG capsule 10/24/2024 Morning  Yes Yes   Sig: Take 1 capsule by mouth 3 times daily.   hydrOXYzine HCl (ATARAX) 50 MG tablet Past Month  Yes Yes   Sig: Take 50 mg by mouth 4 times daily as needed for anxiety.   lisinopril (ZESTRIL) 20 MG tablet 10/24/2024 Morning  Yes Yes   Sig: Take 1 tablet by mouth every morning.   melatonin 3 MG tablet Past Week Bedtime  Yes Yes   Sig: Take 3 mg by mouth at bedtime.   multivitamin, therapeutic (THERA-VIT) TABS tablet 10/24/2024 Morning  Yes Yes   Sig: Take 1 tablet by mouth every morning.   naltrexone (DEPADE/REVIA) 50 MG tablet Not Taking  Yes No   Sig: Take 50 mg by mouth daily.   Patient not taking: Reported on 10/24/2024   nitroGLYcerin (NITROSTAT) 0.4 MG sublingual tablet Unknown  Yes Yes   Sig: Place 0.4 mg under the tongue every 5 minutes as needed for chest pain. Do not crush; maximum of 3 doses in 15 minutes.   omeprazole (PRILOSEC) 20 MG DR capsule Past Month  Yes Yes   Sig: Take 20 mg by mouth daily as needed (heartburn). Take before meals. Do not crush.   senna-docusate (SENOKOT-S/PERICOLACE) 8.6-50 MG tablet Not Taking  Yes No   Sig: Take 2 tablets by  mouth 2 times daily as needed for constipation.   Patient not taking: Reported on 10/24/2024   thiamine (B-1) 100 MG tablet 10/24/2024  Yes Yes   Sig: Take 100 mg by mouth every morning.   tolterodine ER (DETROL LA) 4 MG 24 hr capsule 10/24/2024 Morning  Yes Yes   Sig: Take 4 mg by mouth every morning.      Facility-Administered Medications: None     Allergies   No Known Allergies    Social History   I have reviewed this patient's social history and updated it with pertinent information if needed. Jeffrey King  reports that he quit smoking about 9 years ago. He started smoking about 24 years ago. He has a 3.8 pack-year smoking history. He has never used smokeless tobacco. He reports current alcohol use of about 105.0 standard drinks of alcohol per week. He reports that he does not use drugs.    Family History   I have reviewed this patient's family history and updated it with pertinent information if needed.   History reviewed. No pertinent family history.    Review of Systems   CONSTITUTIONAL:  negative  EYES:  negative  HEENT:  negative  RESPIRATORY:  negative except chronic sob   CARDIOVASCULAR:  negative  GASTROINTESTINAL:  negative  GENITOURINARY:  negative  INTEGUMENT/BREAST:  negative  HEMATOLOGIC/LYMPHATIC:  negative  ALLERGIC/IMMUNOLOGIC:  negative  ENDOCRINE:  negative  MUSCULOSKELETAL:  negative  NEUROLOGICAL:  negative    Physical Exam   Temp: 97  F (36.1  C) Temp src: Temporal BP: 126/74 Pulse: 67   Resp: 22 SpO2: 97 % O2 Device: None (Room air)    Vital Signs with Ranges  Temp:  [96.8  F (36  C)-97.9  F (36.6  C)] 97  F (36.1  C)  Pulse:  [67-83] 67  Resp:  [18-22] 22  BP: (126-164)/(74-95) 126/74  SpO2:  [95 %-97 %] 97 %  0 lbs 0 oz    Constitutional: awake, alert, cooperative, no apparent distress, and appears stated age, vitals stable   Eyes: Lids and lashes normal, pupils equal, round and reactive to light, extra ocular muscles intact, sclera clear, conjunctiva normal  ENT: Normocephalic,  without obvious abnormality, atraumatic, external ears without lesions, oral pharynx with moist mucous membranes, no erythema or exudates, gums normal   Hematologic / Lymphatic: no cervical lymphadenopathy  Respiratory: No increased work of breathing, good air exchange, clear to auscultation bilaterally, no crackles or wheezing  Cardiovascular: Normal apical impulse, regular rate and rhythm, normal S1 and S2, no S3 or S4, and no murmur noted  GI: normal bowel sounds, soft, non-distended, non-tender, no masses palpated, no hepatosplenomegally  Genitounirinary: deferred  Skin: normal skin color, texture, turgor and no redness, warmth, or swelling  Musculoskeletal: There is no redness, warmth, or swelling of the joints.  Full range of motion noted.    Neurologic: Awake, alert, oriented to name, place and time.  Cranial nerves II-XII are grossly intact.    Neuropsychiatric: General: restricted, calm, and normal eye contact    Data   Data reviewed today:   Recent Labs   Lab 10/24/24  1421   WBC 5.8   HGB 14.5   MCV 90      INR 1.02      POTASSIUM 4.0   CHLORIDE 101   CO2 21*   BUN 18.4   CR 1.12   ANIONGAP 14   SIDDHARTH 9.3   *   ALBUMIN 4.3   PROTTOTAL 6.8   BILITOTAL 0.5   ALKPHOS 72   ALT 72*   AST 38   LIPASE 25       No results found for this or any previous visit (from the past 24 hours).

## 2024-10-26 PROCEDURE — 124N000001 HC R&B MH

## 2024-10-26 PROCEDURE — 250N000013 HC RX MED GY IP 250 OP 250 PS 637: Performed by: NURSE PRACTITIONER

## 2024-10-26 PROCEDURE — 99232 SBSQ HOSP IP/OBS MODERATE 35: CPT | Performed by: NURSE PRACTITIONER

## 2024-10-26 RX ORDER — DIAZEPAM 5 MG/1
5 TABLET ORAL
Status: COMPLETED | OUTPATIENT
Start: 2024-10-26 | End: 2024-10-27

## 2024-10-26 RX ADMIN — FUROSEMIDE 40 MG: 20 TABLET ORAL at 08:45

## 2024-10-26 RX ADMIN — METOPROLOL SUCCINATE 25 MG: 25 TABLET, EXTENDED RELEASE ORAL at 08:45

## 2024-10-26 RX ADMIN — FLUTICASONE FUROATE AND VILANTEROL TRIFENATATE 1 PUFF: 100; 25 POWDER RESPIRATORY (INHALATION) at 08:45

## 2024-10-26 RX ADMIN — TOLTERODINE 4 MG: 4 CAPSULE, EXTENDED RELEASE ORAL at 10:33

## 2024-10-26 RX ADMIN — DULOXETINE HYDROCHLORIDE 90 MG: 30 CAPSULE, DELAYED RELEASE PELLETS ORAL at 08:45

## 2024-10-26 RX ADMIN — Medication 100 MG: at 08:45

## 2024-10-26 RX ADMIN — Medication 1 TABLET: at 08:45

## 2024-10-26 RX ADMIN — GABAPENTIN 400 MG: 400 CAPSULE ORAL at 20:22

## 2024-10-26 RX ADMIN — ASPIRIN 81 MG: 81 TABLET, COATED ORAL at 08:45

## 2024-10-26 RX ADMIN — GABAPENTIN 400 MG: 400 CAPSULE ORAL at 13:07

## 2024-10-26 RX ADMIN — GABAPENTIN 400 MG: 400 CAPSULE ORAL at 08:45

## 2024-10-26 RX ADMIN — EMPAGLIFLOZIN 10 MG: 10 TABLET, FILM COATED ORAL at 08:45

## 2024-10-26 RX ADMIN — LISINOPRIL 20 MG: 20 TABLET ORAL at 08:45

## 2024-10-26 RX ADMIN — ROSUVASTATIN CALCIUM 20 MG: 10 TABLET, FILM COATED ORAL at 20:22

## 2024-10-26 RX ADMIN — DIAZEPAM 5 MG: 5 TABLET ORAL at 12:24

## 2024-10-26 RX ADMIN — DOCUSATE SODIUM 100 MG: 100 CAPSULE, LIQUID FILLED ORAL at 08:45

## 2024-10-26 ASSESSMENT — ACTIVITIES OF DAILY LIVING (ADL)
ADLS_ACUITY_SCORE: 0
ADLS_ACUITY_SCORE: 0
ORAL_HYGIENE: INDEPENDENT
ADLS_ACUITY_SCORE: 0
DRESS: INDEPENDENT;SCRUBS (BEHAVIORAL HEALTH)
ADLS_ACUITY_SCORE: 0
ADLS_ACUITY_SCORE: 0
HYGIENE/GROOMING: INDEPENDENT
LAUNDRY: UNABLE TO COMPLETE
ADLS_ACUITY_SCORE: 0
ADLS_ACUITY_SCORE: 0
HYGIENE/GROOMING: INDEPENDENT
ADLS_ACUITY_SCORE: 0
ORAL_HYGIENE: INDEPENDENT
DRESS: SCRUBS (BEHAVIORAL HEALTH)
ADLS_ACUITY_SCORE: 0
LAUNDRY: UNABLE TO COMPLETE
ADLS_ACUITY_SCORE: 0

## 2024-10-26 NOTE — PLAN OF CARE
"  Problem: Adult Behavioral Health Plan of Care  Goal: Patient-Specific Goal (Individualization)  Description: Patient will sleep 6 to 8 hours per night  Patient will eat at least 50% of meals  Patient will attend at least 50% of groups  Patient will comply with recommendations of treatment team  Patient will remain medication compliant  Outcome: Progressing     Problem: Depression  Goal: Decreased Depression Symptoms  Description: Patient will verbalize a decrease in depression, and increase in mood by discharge.  Outcome: Progressing     Problem: Suicide Risk  Goal: Absence of Self-Harm  Outcome: Progressing   Goal Outcome Evaluation:    Plan of Care Reviewed With: patient          Patient spends time in the lounge watching tv before dinner. Affect is full range, mood is calm. States \"I feel better, more comfortable today being here. I saw Kalani, and she changed my meds a bit, and that's helped\". He is able to make his needs known.   Patient attends group this evening. He takes his medications as prescribed, then goes to bed.   Face to face end of shift report communicated to oncoming RN.     Nola Alvarez RN  10/26/2024  10:24 PM               "

## 2024-10-26 NOTE — PLAN OF CARE
Problem: Adult Behavioral Health Plan of Care  Goal: Patient-Specific Goal (Individualization)  Description: Patient will sleep 6 to 8 hours per night  Patient will eat at least 50% of meals  Patient will attend at least 50% of groups  Patient will comply with recommendations of treatment team  Patient will remain medication compliant      10/26/2024 0451 by Leah Arnold, RN  Note: Report received from Nola. Rounding complete. Pt observed sleeping in right side lying position with regular and unlabored respirations.    Pt has been in bed with eyes closed and regular respirations. 15 minute and PRN checks all night. No complaints offered.     Pt slept approx  7.5  hours this NOC shift.    Face to face end of shift report communicated to oncoming RN.    Leah MARIE RN  October 26, 2024  4:51 AM          Problem: Depression  Goal: Decreased Depression Symptoms  Description: Patient will verbalize a decrease in depression, and increase in mood by discharge.  10/26/2024 0451 by Leah Arnold RN  Note: Unable to assess due to pt sleeping. No issues or concerns noted at this time.       Problem: Suicide Risk  Goal: Absence of Self-Harm  10/26/2024 0451 by Leah Arnold RN  Note: Unable to assess due to pt sleeping. Pt has remained free of self-harm.     Goal Outcome Evaluation:

## 2024-10-26 NOTE — PLAN OF CARE
Face to face end of shift report received from Leah SOTELO RN. Rounding completed. Patient observed in bed, awake.     Pt has been calm, cooperative this shift. He is more social today and participates in the unit's milieu. He attends groups with active participation. He reports still feeling weak and overall not well. Pt spoke with provider and it was decided that pt may be in etoh withdrawal. Administered Valium shortly afterwards. Pt denied any SI/HI and AH/VH. Denied pain. He stated that he is trying to take a shower today, but is lacking motivation. He is able to make his needs known. Steady gait- no falls. Frequent rounding.     Problem: Adult Behavioral Health Plan of Care  Goal: Patient-Specific Goal (Individualization)  Description: Patient will sleep 6 to 8 hours per night  Patient will eat at least 50% of meals  Patient will attend at least 50% of groups  Patient will comply with recommendations of treatment team  Patient will remain medication compliant    Outcome: Progressing     Problem: Depression  Goal: Decreased Depression Symptoms  Description: Patient will verbalize a decrease in depression, and increase in mood by discharge.  Outcome: Progressing     Problem: Suicide Risk  Goal: Absence of Self-Harm  Outcome: Progressing       Maxine Miramontes RN  10/26/2024  1:41 PM

## 2024-10-26 NOTE — PROGRESS NOTES
"[facility] PSYCHIATRY  PROGRESS NOTE     SUBJECTIVE     Prior to interviewing the patient, I met with nursing and reviewed patient's clinical condition. We discussed clinical care both before and after the interview. I have reviewed the patient's clinical course by review of records including previous notes, labs, and vital signs.     Per nursing, the patient had the following behavioral events over the last 24-hours: none.    On psychiatric interview, I found pt sitting in group, we went into his room where pt reported his feelings of malaise. He reports feelings of weakness, is shaky, and feels as though his cognition is not as sharp as it generally is. Pt ED record pt had quit drinking 2 weeks ago-today he reports it was more than likely 7-10 days ago, he believes within the past 10 days he has drank 3-4 liters of whiskey and 2 pints. Discussion on current symptoms, as they may be related to latent withdrawal. Pt is eating although notes he is not hungry. Has been on Naltrexone in the past, is willing to restart, will start in the am.    Goes on to discuss his anxiety and depression. He believes the Cymbalta has been most effective for him although \"is not all that great\". Believes if he was busy and could keep his mind off things he would do better. He lives alone and spends too much time watching TV and over thinking things. Denies racing thoughts- consideration given to lamotrigine. Is very interested in volunteering or finding some type of part time job, he does not think anyone will hire him as he cannot commit to 12 months a year. He does not think he could even get into a business due to his anxiety at present. Reminded him this is in the future-there are ways to transition. He is very interested in PHP.    Reports feelings of fatigue, \"Like I could just take a nap\". Made the plan for the weekend to attempt to eat increased protein, nap when he feels fatigued.     MEDICATIONS   Scheduled Meds:  Current " Facility-Administered Medications   Medication Dose Route Frequency Provider Last Rate Last Admin    aspirin EC tablet 81 mg  81 mg Oral QAM Fatmata Wilcox CNP   81 mg at 10/26/24 0845    diazepam (VALIUM) tablet 5 mg  5 mg Oral bid 08 & 14 Kalani Teran APRN CNP        docusate sodium (COLACE) capsule 100 mg  100 mg Oral Daily Fatmata Wilcox CNP   100 mg at 10/26/24 0845    DULoxetine (CYMBALTA) DR capsule 90 mg  90 mg Oral QAM Gunjan Lucas NP   90 mg at 10/26/24 0845    empagliflozin (JARDIANCE) tablet 10 mg  10 mg Oral QAM Fatmata Wilcox, CNP   10 mg at 10/26/24 0845    fluticasone-vilanterol (BREO ELLIPTA) 100-25 MCG/ACT inhaler 1 puff  1 puff Inhalation QAM Fatmata Wilcox CNP   1 puff at 10/26/24 0845    furosemide (LASIX) tablet 40 mg  40 mg Oral QAM Fatmata Wilcox CNP   40 mg at 10/26/24 0845    gabapentin (NEURONTIN) capsule 400 mg  400 mg Oral TID Gunjan Lucas NP   400 mg at 10/26/24 0845    lisinopril (ZESTRIL) tablet 20 mg  20 mg Oral QAM Fatmata Wilcox CNP   20 mg at 10/26/24 0845    metoprolol succinate ER (TOPROL XL) 24 hr tablet 25 mg  25 mg Oral QAM Fatmata Wilcox, CNP   25 mg at 10/26/24 0845    multivitamin w/minerals (THERA-VIT-M) tablet 1 tablet  1 tablet Oral QAM Kilo Wilcoxa, CNP   1 tablet at 10/26/24 0845    rosuvastatin (CRESTOR) tablet 20 mg  20 mg Oral At Bedtime Fatmata Wilcox CNP   20 mg at 10/25/24 2007    thiamine tablet 100 mg  100 mg Oral QAM Kilo Wilcoxa, CNP   100 mg at 10/26/24 0845    tolterodine ER (DETROL LA) 24 hr capsule 4 mg  4 mg Oral QAM RedKilo maddena, CNP   4 mg at 10/26/24 1033     PRN Meds:.  Current Facility-Administered Medications   Medication Dose Route Frequency Provider Last Rate Last Admin    acetaminophen (TYLENOL) tablet 650 mg  650 mg Oral Q4H PRN Isaiah Elizabeth APRN CNP        albuterol (PROVENTIL HFA/VENTOLIN HFA) inhaler  2 puff Inhalation Q4H PRN Fatmata Wilcox CNP   2 puff at 10/25/24 1242    alum & mag hydroxide-simethicone (MAALOX)  suspension 30 mL  30 mL Oral Q4H PRN Isaiah Elizabeth APRN CNP        hydrOXYzine HCl (ATARAX) tablet 25 mg  25 mg Oral 4x Daily PRN Gunjan Lucas NP   25 mg at 10/25/24 2131    melatonin tablet 3 mg  3 mg Oral At Bedtime PRN Isaiah Elizabeth APRN CNP   3 mg at 10/25/24 2131    nicotine (NICORETTE) gum 2 mg  2 mg Buccal Q1H PRN Isaiah Elizabeth APRN CNP        nitroGLYcerin (NITROSTAT) sublingual tablet 0.4 mg  0.4 mg Sublingual Q5 Min PRN Fatmata Wilcox CNP        OLANZapine (zyPREXA) tablet 2.5 mg  2.5 mg Oral TID PRN Isaiah Elizabeth APRN CNP        Or    OLANZapine (zyPREXA) injection 2.5 mg  2.5 mg Intramuscular TID PRN Isaiah Elizabeth APRN CNP        pantoprazole (PROTONIX) EC tablet 40 mg  40 mg Oral Daily PRN Fatmata Wilcox CNP            ALLERGIES   No Known Allergies     MENTAL STATUS EXAM   Vitals: /62   Pulse 65   Temp 97.7  F (36.5  C) (Temporal)   Resp 16   Wt 107.8 kg (237 lb 9.6 oz)   SpO2 94%   BMI 32.22 kg/m      Appearance:  awake, alert, dressed in hospital scrubs, and appeared as age stated  Attitude:  cooperative  Eye Contact:  good  Mood:  anxious and depressed  Affect:  mood congruent  Speech:  clear, coherent  Psychomotor Behavior:  no evidence of tardive dyskinesia, dystonia, or tics  Thought Process:  linear  Associations:  no loose associations  Thought Content:  no evidence of suicidal ideation or homicidal ideation and no evidence of psychotic thought  Insight:  fair  Judgment:  fair  Oriented to:  time, person, and place  Attention Span and Concentration:  fair  Recent and Remote Memory:  intact  Fund of Knowledge: appropriate  Muscle Strength and Tone: normal  Gait and Station: Normal       LABS   No results found for this or any previous visit (from the past 24 hours).      IMPRESSION   This is a 74 year old male with a PMH of MDD, CHELSIE, and alcohol abuse who presents to the ED for evaluation of anxiety and depression. Patient reported taking three tablets of his Ativan this  "morning in an attempt to help his anxiety, though started to feel tired and weak. Tried to get into [facility] though they were full. Today patient denies any SI. He reports feeling more depressed and anxiety over past few weeks, more so the past few days. He notes that he feels very isolated and lonely since retiring from work as a . He has no family or children. He reports feeling he has \"no purpose\". Patient notes that he is interested in increasing his mental health services and looking into volunteer options. He has longer term plan of getting into an assisted living. Is in agreement to continue Cymbalta, Melatonin, Gabapentin. He does not want to continue Ativan and is aware of the risks in taking this medication given his age and his ongoing alcohol use. He was not taking Naltrexone at home, though would be interested in restarting this prior to discharge. Patient is in agreement to voluntary admission for stabilization. He notes that he may be interested in stepping down from the hospital to [facility] prior to return home, can look into this closer to discharge.          DIAGNOSES     #.Major depressive disorder, recurrent, severe  #. Alcohol use disorder, severe  #. Generalized anxiety disorder       PLAN     Location: Unit 5  Legal Status: Orders Placed This Encounter      Voluntary    Safety Assessment:    Behavioral Orders   Procedures    Code 1 - Restrict to Unit    Routine Programming     As clinically indicated    Status 15     Every 15 minutes.      PTA psychotropic medications stopped:     -    PTA psychotropic medications continued/changed:     -    New medications tried and stopped:     -None    New medications initiated:   Valium 5 mg BID X 2 doses    Today's Changes:  Valium 5 mg BID X 2 doses    Programming: Patient will be treated in a therapeutic milieu with appropriate individual and group therapies. Education will be provided on diagnoses, medications, and treatments.     Medical " diagnoses:  Per medicine    Consult: None  Tests: None    Anticipated LOS: 3-5 days  Disposition: home with services.       TREATMENT TEAM CARE PLAN     Progress: Continued symptoms.    Continued Stay Criteria/Rationale: Continued symptoms without sufficient improvement/resolution.    Medical/Physical: See above.    Precautions: See above.     Plan: Continue inpatient care with unit support and medication management.    Rationale for change in precautions or plan: NA due to no change.    Participants: YOSSI Pearson CNP, Nursing, SW, OT.    The patient's care was discussed with the treatment team and chart notes were reviewed.       ATTESTATION      YOSSI Pearson CNP

## 2024-10-27 PROCEDURE — 250N000013 HC RX MED GY IP 250 OP 250 PS 637: Performed by: NURSE PRACTITIONER

## 2024-10-27 PROCEDURE — 99232 SBSQ HOSP IP/OBS MODERATE 35: CPT | Performed by: NURSE PRACTITIONER

## 2024-10-27 PROCEDURE — 124N000001 HC R&B MH

## 2024-10-27 RX ORDER — QUETIAPINE FUMARATE 25 MG/1
25 TABLET, FILM COATED ORAL AT BEDTIME
Status: DISCONTINUED | OUTPATIENT
Start: 2024-10-27 | End: 2024-10-31

## 2024-10-27 RX ORDER — CALCIUM CARBONATE 500 MG/1
500 TABLET, CHEWABLE ORAL DAILY PRN
Status: DISCONTINUED | OUTPATIENT
Start: 2024-10-27 | End: 2024-11-05 | Stop reason: HOSPADM

## 2024-10-27 RX ADMIN — GABAPENTIN 400 MG: 400 CAPSULE ORAL at 08:55

## 2024-10-27 RX ADMIN — EMPAGLIFLOZIN 10 MG: 10 TABLET, FILM COATED ORAL at 08:56

## 2024-10-27 RX ADMIN — METOPROLOL SUCCINATE 25 MG: 25 TABLET, EXTENDED RELEASE ORAL at 08:55

## 2024-10-27 RX ADMIN — GABAPENTIN 400 MG: 400 CAPSULE ORAL at 13:44

## 2024-10-27 RX ADMIN — DULOXETINE HYDROCHLORIDE 90 MG: 30 CAPSULE, DELAYED RELEASE PELLETS ORAL at 08:55

## 2024-10-27 RX ADMIN — ASPIRIN 81 MG: 81 TABLET, COATED ORAL at 08:55

## 2024-10-27 RX ADMIN — ROSUVASTATIN CALCIUM 20 MG: 10 TABLET, FILM COATED ORAL at 20:13

## 2024-10-27 RX ADMIN — LISINOPRIL 20 MG: 20 TABLET ORAL at 08:55

## 2024-10-27 RX ADMIN — DIAZEPAM 5 MG: 5 TABLET ORAL at 08:55

## 2024-10-27 RX ADMIN — FLUTICASONE FUROATE AND VILANTEROL TRIFENATATE 1 PUFF: 100; 25 POWDER RESPIRATORY (INHALATION) at 08:56

## 2024-10-27 RX ADMIN — Medication 1 TABLET: at 08:56

## 2024-10-27 RX ADMIN — QUETIAPINE FUMARATE 25 MG: 25 TABLET ORAL at 20:13

## 2024-10-27 RX ADMIN — FUROSEMIDE 40 MG: 20 TABLET ORAL at 08:55

## 2024-10-27 RX ADMIN — DOCUSATE SODIUM 100 MG: 100 CAPSULE, LIQUID FILLED ORAL at 08:56

## 2024-10-27 RX ADMIN — Medication 100 MG: at 08:56

## 2024-10-27 RX ADMIN — GABAPENTIN 400 MG: 400 CAPSULE ORAL at 20:13

## 2024-10-27 RX ADMIN — TOLTERODINE 4 MG: 4 CAPSULE, EXTENDED RELEASE ORAL at 09:14

## 2024-10-27 ASSESSMENT — ACTIVITIES OF DAILY LIVING (ADL)
ADLS_ACUITY_SCORE: 0
ORAL_HYGIENE: INDEPENDENT
DRESS: INDEPENDENT;SCRUBS (BEHAVIORAL HEALTH)
ADLS_ACUITY_SCORE: 0
LAUNDRY: UNABLE TO COMPLETE
ADLS_ACUITY_SCORE: 0
DRESS: SCRUBS (BEHAVIORAL HEALTH)
ADLS_ACUITY_SCORE: 0
HYGIENE/GROOMING: INDEPENDENT
ADLS_ACUITY_SCORE: 0
LAUNDRY: UNABLE TO COMPLETE
ADLS_ACUITY_SCORE: 0
HYGIENE/GROOMING: INDEPENDENT
ORAL_HYGIENE: INDEPENDENT

## 2024-10-27 NOTE — PLAN OF CARE
Problem: Adult Behavioral Health Plan of Care  Goal: Patient-Specific Goal (Individualization)  Description: Patient will sleep 6 to 8 hours per night  Patient will eat at least 50% of meals  Patient will attend at least 50% of groups  Patient will comply with recommendations of treatment team  Patient will remain medication compliant      Outcome: Progressing  Note: Report received from Nola. Rounding complete. Pt observed sleeping in right side lying position with regular and unlabored respirations.    Pt has been in bed with eyes closed and regular respirations. 15 minute and PRN checks all night. No complaints offered.     Pt slept approx  8  hours this NOC shift.    Face to face end of shift report communicated to oncoming RN.    Leah MARIE RN  October 27, 2024  3:48 AM          Problem: Depression  Goal: Decreased Depression Symptoms  Description: Patient will verbalize a decrease in depression, and increase in mood by discharge.  Outcome: Progressing  Note: Unable to assess due to pt sleeping. No issues or concerns noted at this time.       Problem: Suicide Risk  Goal: Absence of Self-Harm  Outcome: Progressing  Note: Unable to assess due to pt sleeping. Pt has remained free of self-harm.     Goal Outcome Evaluation:

## 2024-10-27 NOTE — PROGRESS NOTES
"[facility] PSYCHIATRY  PROGRESS NOTE     SUBJECTIVE     Prior to interviewing the patient, I met with nursing and reviewed patient's clinical condition. We discussed clinical care both before and after the interview. I have reviewed the patient's clinical course by review of records including previous notes, labs, and vital signs.     Per nursing, the patient had the following behavioral events over the last 24-hours: none.    On psychiatric interview, I found pt sitting in the day room watching TV, we went to his rrom for the interview.Pt continues to feel overwhelmed, was able to redirect and move to setting goals. Decided he has 3 areas to focus on 1 being Today and his mood which he describes as a \"band around my head\", going on to tell of his past and how he feels today.Reminded him this is a cognitive distortion in \"all or nothing thinking\". This thinking validated his need for therapy at time of discharge. Today's goal is to attend group, focus on self care, and rest when he feels fatigued. Second top was to identify what will change when he return home to prevent this from happening again. He acknowledges he needs help, would like a senior  to grocery shop with, prepare meals with, and just help him out in the home. 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 Has done thi in the past-encouraged to reach out and see where this is needed or do some 1:1 prayer with people shut in or in a NH. Lastly talked about what is going to keep him off ETOH. He identifies a major trigger which is sleep. He resumed drinking after his surgery the first time he had a bout of insomnia and was awake for 2 nights. Discussed adding seroquel which he is willing to do. Also encouraged AA meeting with 90 in 90 days upon discharge .    Pt does believe the valium yesterday was " "beneficial \"but so much of these feelings are still here\". Reminded the valium was only to settle things down and much of the negative feelings he is having is social and environmental.     MEDICATIONS   Scheduled Meds:  Current Facility-Administered Medications   Medication Dose Route Frequency Provider Last Rate Last Admin    aspirin EC tablet 81 mg  81 mg Oral QAM Fatmata Wilcox CNP   81 mg at 10/27/24 0855    docusate sodium (COLACE) capsule 100 mg  100 mg Oral Daily Fatmata Wilcox CNP   100 mg at 10/27/24 0856    DULoxetine (CYMBALTA) DR capsule 90 mg  90 mg Oral QAM Gunjan Lucas, HEATH   90 mg at 10/27/24 0855    empagliflozin (JARDIANCE) tablet 10 mg  10 mg Oral QAM Fatmata Wilcox CNP   10 mg at 10/27/24 0856    fluticasone-vilanterol (BREO ELLIPTA) 100-25 MCG/ACT inhaler 1 puff  1 puff Inhalation QAM Fatmata Wilcox CNP   1 puff at 10/27/24 0856    furosemide (LASIX) tablet 40 mg  40 mg Oral QAM Fatmata Wilcox CNP   40 mg at 10/27/24 0855    gabapentin (NEURONTIN) capsule 400 mg  400 mg Oral TID Gunjan Lucas NP   400 mg at 10/27/24 0855    lisinopril (ZESTRIL) tablet 20 mg  20 mg Oral QAM Fatmata Wilcox CNP   20 mg at 10/27/24 0855    metoprolol succinate ER (TOPROL XL) 24 hr tablet 25 mg  25 mg Oral QAM Fatmata Wilcox CNP   25 mg at 10/27/24 0855    multivitamin w/minerals (THERA-VIT-M) tablet 1 tablet  1 tablet Oral QAM Fatmata Wilcox CNP   1 tablet at 10/27/24 0856    QUEtiapine (SEROquel) tablet 25 mg  25 mg Oral At Bedtime Kalani Teran APRN CNP        rosuvastatin (CRESTOR) tablet 20 mg  20 mg Oral At Bedtime Fatmata Wilcox CNP   20 mg at 10/26/24 2022    thiamine tablet 100 mg  100 mg Oral QAM Fatmata Wilcox CNP   100 mg at 10/27/24 0856    tolterodine ER (DETROL LA) 24 hr capsule 4 mg  4 mg Oral Fatmata Velasquez CNP   4 mg at 10/27/24 0914     PRN Meds:.  Current Facility-Administered Medications   Medication Dose Route Frequency Provider Last Rate Last Admin    acetaminophen (TYLENOL) " tablet 650 mg  650 mg Oral Q4H PRN Isaiah Elizabeth APRN CNP        albuterol (PROVENTIL HFA/VENTOLIN HFA) inhaler  2 puff Inhalation Q4H PRN Fatmata Wilcox CNP   2 puff at 10/25/24 1242    alum & mag hydroxide-simethicone (MAALOX) suspension 30 mL  30 mL Oral Q4H PRN Isaiah Elizabeth APRN CNP        calcium carbonate (TUMS) chewable tablet 500 mg  500 mg Oral Daily PRN Kalani Teran APRN CNP        hydrOXYzine HCl (ATARAX) tablet 25 mg  25 mg Oral 4x Daily PRN Gunjan Lucas NP   25 mg at 10/25/24 2131    melatonin tablet 3 mg  3 mg Oral At Bedtime PRN Isaiah Elizabeth APRN CNP   3 mg at 10/25/24 2131    nicotine (NICORETTE) gum 2 mg  2 mg Buccal Q1H PRN Isaiah Elizabeth APRN CNP        nitroGLYcerin (NITROSTAT) sublingual tablet 0.4 mg  0.4 mg Sublingual Q5 Min PRN Fatmata Wilcox CNP        OLANZapine (zyPREXA) tablet 2.5 mg  2.5 mg Oral TID PRN Isaiah Elizabeth APRN CNP        Or    OLANZapine (zyPREXA) injection 2.5 mg  2.5 mg Intramuscular TID PRN Isaiah Elizabeth APRN CNP        pantoprazole (PROTONIX) EC tablet 40 mg  40 mg Oral Daily PRN Fatmata Wilcox CNP            ALLERGIES   No Known Allergies     MENTAL STATUS EXAM   Vitals: /66   Pulse 82   Temp 97.9  F (36.6  C) (Temporal)   Resp 18   Wt 107.8 kg (237 lb 9.6 oz)   SpO2 95%   BMI 32.22 kg/m      Appearance:  awake, alert, dressed in hospital scrubs, and appeared as age stated  Attitude:  cooperative  Eye Contact:  good  Mood:  anxious and depressed  Affect:  mood congruent  Speech:  clear, coherent  Psychomotor Behavior:  no evidence of tardive dyskinesia, dystonia, or tics  Thought Process:  linear  Associations:  no loose associations  Thought Content:  no evidence of suicidal ideation or homicidal ideation and no evidence of psychotic thought  Insight:  fair  Judgment:  fair  Oriented to:  time, person, and place  Attention Span and Concentration:  fair  Recent and Remote Memory:  intact  Fund of Knowledge: appropriate  Muscle Strength and  "Tone: normal  Gait and Station: Normal       LABS   No results found for this or any previous visit (from the past 24 hours).      IMPRESSION   This is a 74 year old male with a PMH of MDD, CHELSIE, and alcohol abuse who presents to the ED for evaluation of anxiety and depression. Patient reported taking three tablets of his Ativan this morning in an attempt to help his anxiety, though started to feel tired and weak. Tried to get into [facility] though they were full. Today patient denies any SI. He reports feeling more depressed and anxiety over past few weeks, more so the past few days. He notes that he feels very isolated and lonely since retiring from work as a . He has no family or children. He reports feeling he has \"no purpose\". Patient notes that he is interested in increasing his mental health services and looking into volunteer options. He has longer term plan of getting into an assisted living. Is in agreement to continue Cymbalta, Melatonin, Gabapentin. He does not want to continue Ativan and is aware of the risks in taking this medication given his age and his ongoing alcohol use. He was not taking Naltrexone at home, though would be interested in restarting this prior to discharge. Patient is in agreement to voluntary admission for stabilization. He notes that he may be interested in stepping down from the hospital to [facility] prior to return home, can look into this closer to discharge.          DIAGNOSES     #.Major depressive disorder, recurrent, severe  #. Alcohol use disorder, severe  #. Generalized anxiety disorder       PLAN     Location: Unit 5  Legal Status: Orders Placed This Encounter      Voluntary    Safety Assessment:    Behavioral Orders   Procedures    Code 1 - Restrict to Unit    Routine Programming     As clinically indicated    Status 15     Every 15 minutes.      PTA psychotropic medications stopped:     -    PTA psychotropic medications continued/changed:     -    New medications " tried and stopped:   Valium 5 mg BID X 2 doses.    New medications initiated:   Seroquel 25 mg at hs    Today's Changes:  Seroquel 25 mg take 1 at hs    Programming: Patient will be treated in a therapeutic milieu with appropriate individual and group therapies. Education will be provided on diagnoses, medications, and treatments.     Medical diagnoses:  Per medicine    Consult: None  Tests: None    Anticipated LOS: 3-5 days  Disposition: home with services.       TREATMENT TEAM CARE PLAN     Progress: Continued symptoms.    Continued Stay Criteria/Rationale: Continued symptoms without sufficient improvement/resolution.    Medical/Physical: See above.    Precautions: See above.     Plan: Continue inpatient care with unit support and medication management.    Rationale for change in precautions or plan: NA due to no change.    Participants: YOSSI Pearson CNP, Nursing, SW, OT.    The patient's care was discussed with the treatment team and chart notes were reviewed.       ATTESTATION      YOSSI Pearson CNP

## 2024-10-27 NOTE — PLAN OF CARE
Face to face end of shift report received from Leah SOTELO RN. Rounding completed. Patient observed in bed, awake.     Pt has been calm, cooperative this shift. He ha a bright affect. He reports still feeling tired throughout the shift and naps frequently. He participates in the unit's milieu with appropriate behaviors. He denied any SI/HI and AH/VH. Denied pain. States he will shower at some point today. He took all medications as prescribed. He is able to make his needs known. Steady-gait no falls. Frequent rounding.      Problem: Adult Behavioral Health Plan of Care  Goal: Patient-Specific Goal (Individualization)  Description: Patient will sleep 6 to 8 hours per night  Patient will eat at least 50% of meals  Patient will attend at least 50% of groups  Patient will comply with recommendations of treatment team  Patient will remain medication compliant      Outcome: Progressing     Problem: Depression  Goal: Decreased Depression Symptoms  Description: Patient will verbalize a decrease in depression, and increase in mood by discharge.  Outcome: Progressing     Problem: Suicide Risk  Goal: Absence of Self-Harm  Outcome: Progressing       Maxine Miramontes RN  10/27/2024  1:31 PM

## 2024-10-27 NOTE — PLAN OF CARE
"  Problem: Adult Behavioral Health Plan of Care  Goal: Patient-Specific Goal (Individualization)  Description: Patient will sleep 6 to 8 hours per night  Patient will eat at least 50% of meals  Patient will attend at least 50% of groups  Patient will comply with recommendations of treatment team  Patient will remain medication compliant  Outcome: Progressing     Problem: Depression  Goal: Decreased Depression Symptoms  Description: Patient will verbalize a decrease in depression, and increase in mood by discharge.  Outcome: Progressing     Problem: Suicide Risk  Goal: Absence of Self-Harm  Outcome: Progressing   Goal Outcome Evaluation:    Plan of Care Reviewed With: patient          Patient is pleasant and cooperative. Affect is full range, mood is anxious. He spends time in the lounge watching tv. States \"I just feel off, maybe it's something psychosocial, I don't know, I feel anxious and I have a bit of nausea, I would ask for Zofran, but I don't think it's that bad\". He showered at the start of the shift, writer applied his compression socks and trimmed his eyebrows. He is willing to try Ginger Ale for nausea, he was able to eat dinner and snack with no issues. He struggles with putting his socks on, states he would like OT/PT to work on bending his knee better.   Patient spends time in group painting.   Face to face end of shift report communicated to oncoming RN.     Nola Alvarez RN  10/27/2024  11:00 PM               "

## 2024-10-28 PROCEDURE — 250N000013 HC RX MED GY IP 250 OP 250 PS 637: Performed by: NURSE PRACTITIONER

## 2024-10-28 PROCEDURE — 99232 SBSQ HOSP IP/OBS MODERATE 35: CPT | Performed by: NURSE PRACTITIONER

## 2024-10-28 PROCEDURE — 124N000001 HC R&B MH

## 2024-10-28 RX ADMIN — EMPAGLIFLOZIN 10 MG: 10 TABLET, FILM COATED ORAL at 08:29

## 2024-10-28 RX ADMIN — GABAPENTIN 400 MG: 400 CAPSULE ORAL at 13:25

## 2024-10-28 RX ADMIN — Medication 1 TABLET: at 08:28

## 2024-10-28 RX ADMIN — QUETIAPINE FUMARATE 25 MG: 25 TABLET ORAL at 20:07

## 2024-10-28 RX ADMIN — FLUTICASONE FUROATE AND VILANTEROL TRIFENATATE 1 PUFF: 100; 25 POWDER RESPIRATORY (INHALATION) at 08:32

## 2024-10-28 RX ADMIN — Medication 100 MG: at 08:28

## 2024-10-28 RX ADMIN — DULOXETINE HYDROCHLORIDE 90 MG: 30 CAPSULE, DELAYED RELEASE PELLETS ORAL at 08:30

## 2024-10-28 RX ADMIN — TOLTERODINE 4 MG: 4 CAPSULE, EXTENDED RELEASE ORAL at 08:36

## 2024-10-28 RX ADMIN — DOCUSATE SODIUM 100 MG: 100 CAPSULE, LIQUID FILLED ORAL at 08:29

## 2024-10-28 RX ADMIN — GABAPENTIN 400 MG: 400 CAPSULE ORAL at 20:07

## 2024-10-28 RX ADMIN — FUROSEMIDE 40 MG: 20 TABLET ORAL at 08:30

## 2024-10-28 RX ADMIN — LISINOPRIL 20 MG: 20 TABLET ORAL at 08:28

## 2024-10-28 RX ADMIN — GABAPENTIN 400 MG: 400 CAPSULE ORAL at 08:35

## 2024-10-28 RX ADMIN — ASPIRIN 81 MG: 81 TABLET, COATED ORAL at 08:29

## 2024-10-28 RX ADMIN — ROSUVASTATIN CALCIUM 20 MG: 10 TABLET, FILM COATED ORAL at 20:07

## 2024-10-28 RX ADMIN — METOPROLOL SUCCINATE 25 MG: 25 TABLET, EXTENDED RELEASE ORAL at 08:29

## 2024-10-28 ASSESSMENT — ACTIVITIES OF DAILY LIVING (ADL)
ADLS_ACUITY_SCORE: 0
DRESS: INDEPENDENT
ADLS_ACUITY_SCORE: 0
ORAL_HYGIENE: INDEPENDENT
ADLS_ACUITY_SCORE: 0
HYGIENE/GROOMING: INDEPENDENT
ADLS_ACUITY_SCORE: 0
HYGIENE/GROOMING: INDEPENDENT
ADLS_ACUITY_SCORE: 0
DRESS: INDEPENDENT
ADLS_ACUITY_SCORE: 0
ORAL_HYGIENE: INDEPENDENT
ADLS_ACUITY_SCORE: 0

## 2024-10-28 NOTE — PLAN OF CARE
"Face to face shift report received from Mercedes DUNNE RN. Rounding completed, pt observed.    Problem: Adult Behavioral Health Plan of Care  Goal: Patient-Specific Goal (Individualization)  Description: Patient will sleep 6 to 8 hours per night  Patient will eat at least 50% of meals  Patient will attend at least 50% of groups  Patient will comply with recommendations of treatment team  Patient will remain medication compliant      Outcome: Progressing  Note: Shift Summary:  Patient was resting on his bed at the start of this shift. Patient became tearful during interaction with this writer when discussing what got him in the hospital and with what he would like to see happen for him while here.  Patient expresses hopelessness and helplessness.  He states that he is not suicidal but \"wouldn't care if I \".  He tells nurse about these feelings coming on since his heart surgery in July but that it has become overwhelming recently.  He says that his GF \"just wants to be friends\" and states this saddens him. Nurse encouraged him that at elderly ages it is not uncommon to just be friends.  He denies any issues with mental health or alcohol prior to halfway in 2018.  Nurse allowed patient to vent his frustrations then able to discuss what he can do to recover and the things he finds satisfying or enjoys..  He talked of watching the news and that it is important to him today.  Nurse assisted him to be able to watch the news station he preferred at 1730.  He is interested in theology and had majored in it before law.  Patient has remained up on the unit since evening meal. Gait is steady.  Denies physical pain.     Problem: Depression  Goal: Decreased Depression Symptoms  Description: Patient will verbalize a decrease in depression, and increase in mood by discharge.  Outcome: Progressing     Problem: Suicide Risk  Goal: Absence of Self-Harm  Description: Patient will be free of suicidal ideation or intent by " discharge.  Outcome: Progressing  Face to face report will be communicated to oncoming RN.    Randi Erazo RN  10/28/2024

## 2024-10-28 NOTE — PROGRESS NOTES
Reviewed AVS. Updated Alcoholics Anonymous information and added chemical dependency treatment resources.

## 2024-10-28 NOTE — PLAN OF CARE
Problem: Adult Behavioral Health Plan of Care  Goal: Patient-Specific Goal (Individualization)  Description: Patient will sleep 6 to 8 hours per night  Patient will eat at least 50% of meals  Patient will attend at least 50% of groups  Patient will comply with recommendations of treatment team  Patient will remain medication compliant      Outcome: Progressing  Note: Report received from Nola. Rounding complete. Pt observed sleeping in Right side lying position with regular and unlabored respirations.    Pt has been in bed with eyes closed and regular respirations. 15 minute and PRN checks all night. No complaints offered.     Pt slept approx  8  hours this NOC shift.    Face to face end of shift report communicated to oncoming RN.    Leah MARIE RN  October 28, 2024  5:50 AM          Problem: Depression  Goal: Decreased Depression Symptoms  Description: Patient will verbalize a decrease in depression, and increase in mood by discharge.  Outcome: Progressing  Note: Unable to assess due to pt sleeping. No issues or concerns noted at this time.       Problem: Suicide Risk  Goal: Absence of Self-Harm  Outcome: Progressing  Note: Unable to assess due to pt sleeping. Pt has remained free of self-harm.     Goal Outcome Evaluation:

## 2024-10-28 NOTE — PLAN OF CARE
"  Problem: Adult Behavioral Health Plan of Care  Goal: Patient-Specific Goal (Individualization)  Description: Patient will sleep 6 to 8 hours per night  Patient will eat at least 50% of meals  Patient will attend at least 50% of groups  Patient will comply with recommendations of treatment team  Patient will remain medication compliant      Outcome: Progressing     Problem: Adult Behavioral Health Plan of Care  Goal: Adheres to Safety Considerations for Self and Others  Outcome: Progressing     Problem: Adult Behavioral Health Plan of Care  Goal: Absence of New-Onset Illness or Injury  Outcome: Progressing     Problem: Adult Behavioral Health Plan of Care  Goal: Optimized Coping Skills in Response to Life Stressors  Outcome: Progressing     Problem: Adult Behavioral Health Plan of Care  Goal: Develops/Participates in Therapeutic [place] to Support Successful Transition  Outcome: Progressing     Problem: Depression  Goal: Decreased Depression Symptoms  Description: Patient will verbalize a decrease in depression, and increase in mood by discharge.  Outcome: Progressing     Problem: Suicide Risk  Goal: Absence of Self-Harm  Outcome: Progressing    07:30 - Face to face end of shift report has been received by night shift rn pt observed lying on his bed and respirations non labored and eyes closed.  09:15 - pt out for breakfast this am, good appetite, denies pain, denies si, states no hallucinations but has \"worries\"  states he doesn't feel \"up to par\" yet.  Takes meds as prescribed and going to group this am.  Safety rounds done every 15 minutes. Pt steady on feet.  12:40- out at table in lounge, pt states he feels like his life is almost over, states that he is 74 and has nothing to look forward to anymore.  Reports that he is forcing himself to go to groups and takes his meds and is hoping he will feel better.  He expresses that he is afraid he will be sent home before he really feels ready.  Reassured pt and " encouraged to keep participating and to let staff know about any thoughts he's having.  14:00 - [facility]  Face to face end of shift report will be communicated to evening shift rn.  :

## 2024-10-28 NOTE — PROGRESS NOTES
Called [facility] in [place], [place] to make sure therapy was set up for pt. Verified that pt has an therapy, rescheduled for pt. They will be able to set up ARMHS or targeted CM once pt has attended an appointment.       [facility]   [address] [place] [address]  [phone]  Flora Hernández- November 5th @ 11:00am.     [facility]  [address] [place] [address]  [phone]  Psychiatry: Aleisha Mohan- November 26th @ 1:00pm

## 2024-10-28 NOTE — PROGRESS NOTES
"[facility] PSYCHIATRY  PROGRESS NOTE     SUBJECTIVE     Prior to interviewing the patient, I met with nursing and reviewed patient's clinical condition. We discussed clinical care both before and after the interview. I have reviewed the patient's clinical course by review of records including previous notes, labs, and vital signs.     Per nursing, the patient had the following behavioral events over the last 24-hours: none.    On psychiatric interview, patient is seen in his room. Reports he is very depressed. Feels better today but is unable to think about the level of his anxiety and depression. Reports he will feel sick and sad if he thinks about how poorly he feels. He did sleep better on seroquel last night, feels that may be contributing to him feeling better today.     He reports his alcohol use became a problem in  and went to several places before went to [facility] as he was paying cash for treatment. Was doing okay for a period of time, staying there over the winter months eventually ended up relapsing, got ill to the point of sepsis. Was back in treatment again before returning to the [facility]. Alcohol use has continued and was escalating the amount he was using. Last looked and had 3 empty liter bottles on his counter not remembering that he drank them. Was into detox in [place] on 10/16/2024, was a difficult experience for him and he left as soon as he could. He is open to residential CD treatment.  He is open to therapy but can't do video, reports he's not very tech savvy at all. Doesn't feel comfortable talking to the computer about personal issues.     He states he's not suicidal but feels that he has no hope, feels that he's \"an old drunk\" with nothing. His family is , never  and never had children.     Note recent s/p Coronary Artery Bypass [place]ing times 4 (SVG-OM 1, SVG-PDA, SVG-diag 2, and LIMA-LAD) on 24 by Dr Hazel, followed by a short nursing home stay and then to " cardiac rehab.        MEDICATIONS   Scheduled Meds:  Current Facility-Administered Medications   Medication Dose Route Frequency Provider Last Rate Last Admin    aspirin EC tablet 81 mg  81 mg Oral QAM RedKilo maddena, CNP   81 mg at 10/28/24 0829    docusate sodium (COLACE) capsule 100 mg  100 mg Oral Daily RedCindi maddenesa, CNP   100 mg at 10/28/24 0829    DULoxetine (CYMBALTA) DR capsule 90 mg  90 mg Oral QAM Gunjan Lucas, NP   90 mg at 10/28/24 0830    empagliflozin (JARDIANCE) tablet 10 mg  10 mg Oral QAM Reder, Fatmata, CNP   10 mg at 10/28/24 0829    fluticasone-vilanterol (BREO ELLIPTA) 100-25 MCG/ACT inhaler 1 puff  1 puff Inhalation QAM RederKiloa, CNP   1 puff at 10/28/24 0832    furosemide (LASIX) tablet 40 mg  40 mg Oral QAM RedKilo maddena, CNP   40 mg at 10/28/24 0830    gabapentin (NEURONTIN) capsule 400 mg  400 mg Oral TID Gunjan Lucas, NP   400 mg at 10/28/24 0835    lisinopril (ZESTRIL) tablet 20 mg  20 mg Oral QAM Reder, Fatmata, CNP   20 mg at 10/28/24 0828    metoprolol succinate ER (TOPROL XL) 24 hr tablet 25 mg  25 mg Oral QAM Reder, Fatmata, CNP   25 mg at 10/28/24 0829    multivitamin w/minerals (THERA-VIT-M) tablet 1 tablet  1 tablet Oral QAM Reder Fatmata, CNP   1 tablet at 10/28/24 0828    QUEtiapine (SEROquel) tablet 25 mg  25 mg Oral At Bedtime Kalani Teran APRN CNP   25 mg at 10/27/24 2013    rosuvastatin (CRESTOR) tablet 20 mg  20 mg Oral At Bedtime Cindi Wilcoxesa, CNP   20 mg at 10/27/24 2013    thiamine tablet 100 mg  100 mg Oral QAM Reder, Fatmata, CNP   100 mg at 10/28/24 0828    tolterodine ER (DETROL LA) 24 hr capsule 4 mg  4 mg Oral QAM Reder, Fatmata, CNP   4 mg at 10/28/24 0836     PRN Meds:.  Current Facility-Administered Medications   Medication Dose Route Frequency Provider Last Rate Last Admin    acetaminophen (TYLENOL) tablet 650 mg  650 mg Oral Q4H PRN Isaiah Elizabeth APRN CNP        albuterol (PROVENTIL HFA/VENTOLIN HFA) inhaler  2 puff Inhalation Q4H PRN Reder,  SERA Avilez   2 puff at 10/25/24 1242    alum & mag hydroxide-simethicone (MAALOX) suspension 30 mL  30 mL Oral Q4H PRN Isaiah Elizabeth APRN CNP        calcium carbonate (TUMS) chewable tablet 500 mg  500 mg Oral Daily PRN Kalani Teran APRN CNP        hydrOXYzine HCl (ATARAX) tablet 25 mg  25 mg Oral 4x Daily PRN Gunjan Lucas NP   25 mg at 10/25/24 2131    melatonin tablet 3 mg  3 mg Oral At Bedtime PRN Isaiah Elizabeth APRN CNP   3 mg at 10/25/24 2131    nicotine (NICORETTE) gum 2 mg  2 mg Buccal Q1H PRN Isaiah Elizabeth APRN CNP        nitroGLYcerin (NITROSTAT) sublingual tablet 0.4 mg  0.4 mg Sublingual Q5 Min PRN Fatmata Wilcox CNP        OLANZapine (zyPREXA) tablet 2.5 mg  2.5 mg Oral TID PRN Isaiah Elizabeth APRN CNP        Or    OLANZapine (zyPREXA) injection 2.5 mg  2.5 mg Intramuscular TID PRN Isaiah Elizabeth APRN CNP        pantoprazole (PROTONIX) EC tablet 40 mg  40 mg Oral Daily PRN Fatmata Wilcox CNP            ALLERGIES   No Known Allergies     MENTAL STATUS EXAM   Vitals: /57   Pulse 65   Temp 96.9  F (36.1  C) (Temporal)   Resp 16   Wt 107.8 kg (237 lb 9.6 oz)   SpO2 96%   BMI 32.22 kg/m      Appearance:  awake, alert, dressed in hospital scrubs, and appeared as age stated  Attitude:  cooperative  Eye Contact:  good  Mood:  anxious and depressed  Affect:  mood congruent  Speech:  clear, coherent  Psychomotor Behavior:  no evidence of tardive dyskinesia, dystonia, or tics  Thought Process:  linear  Associations:  no loose associations  Thought Content:  passive suicidal ideation present, no homicidal ideation, no evidence of psychotic thought  Insight:  limited  Judgment:  limited  Oriented to:  time, person, and place  Attention Span and Concentration:  fair  Recent and Remote Memory:  intact  Fund of Knowledge: appropriate  Muscle Strength and Tone: normal  Gait and Station: Normal       LABS   No results found for this or any previous visit (from the past 24 hours).      IMPRESSION  "  This is a 74 year old male with a PMH of MDD, CHELSIE, and alcohol abuse who presents to the ED for evaluation of anxiety and depression. Patient reported taking three tablets of his Ativan this morning in an attempt to help his anxiety, though started to feel tired and weak. Tried to get into [facility] though they were full. Today patient denies any SI. He reports feeling more depressed and anxiety over past few weeks, more so the past few days. He notes that he feels very isolated and lonely since retiring from work as a . He has no family or children. He reports feeling he has \"no purpose\". Patient notes that he is interested in increasing his mental health services and looking into volunteer options. He has longer term plan of getting into an assisted living. Is in agreement to continue Cymbalta, Melatonin, Gabapentin. He does not want to continue Ativan and is aware of the risks in taking this medication given his age and his ongoing alcohol use. He was not taking Naltrexone at home, though would be interested in restarting this prior to discharge. Patient is in agreement to voluntary admission for stabilization. He notes that he may be interested in stepping down from the hospital to [facility] prior to return home, can look into this closer to discharge.     Today: Continues with passive SI. He reports being on every selective serotonin reuptake inhibitor without benefit, will look at what he's been on in the past more closely and determine what may be helpful for him especially considering his significant cardiac history. He is open to residential CD program, however insurance will be an issue for him.       DIAGNOSES     #.Major depressive disorder, recurrent, severe  #. Alcohol use disorder, severe  #. Generalized anxiety disorder       PLAN     Location: Unit 5  Legal Status: Orders Placed This Encounter      Voluntary    Safety Assessment:    Behavioral Orders   Procedures    Code 1 - Restrict to Unit "    Routine Programming     As clinically indicated    Status 15     Every 15 minutes.      PTA psychotropic medications stopped:     -    PTA psychotropic medications continued/changed:     - duloxetine 90 mg qAM    New medications tried and stopped:   Valium 5 mg BID X 2 doses.    New medications initiated:   Seroquel 25 mg at hs    Today's Changes:   - CD eval ordered    Programming: Patient will be treated in a therapeutic milieu with appropriate individual and group therapies. Education will be provided on diagnoses, medications, and treatments.     Medical diagnoses:  Per medicine    Consult: None  Tests: None    Anticipated LOS: 3-5 days  Disposition: home with services.       TREATMENT TEAM CARE PLAN     Progress: Continued symptoms.    Continued Stay Criteria/Rationale: Continued symptoms without sufficient improvement/resolution.    Medical/Physical: See above.    Precautions: See above.     Plan: Continue inpatient care with unit support and medication management.    Rationale for change in precautions or plan: NA due to no change.    Participants: YOSSI Callejas, DEAN-BC, FNP-C    The patient's care was discussed with the treatment team and chart notes were reviewed.       ATTESTATION      YOSSI Callejas, CHIRAGP-BC, FNP-C

## 2024-10-29 LAB
ALBUMIN SERPL BCG-MCNC: 4.2 G/DL (ref 3.5–5.2)
ALP SERPL-CCNC: 71 U/L (ref 40–150)
ALT SERPL W P-5'-P-CCNC: 45 U/L (ref 0–70)
ANION GAP SERPL CALCULATED.3IONS-SCNC: 13 MMOL/L (ref 7–15)
AST SERPL W P-5'-P-CCNC: 26 U/L (ref 0–45)
BASOPHILS # BLD AUTO: 0.1 10E3/UL (ref 0–0.2)
BASOPHILS NFR BLD AUTO: 1 %
BILIRUB SERPL-MCNC: 0.3 MG/DL
BUN SERPL-MCNC: 26.5 MG/DL (ref 8–23)
CALCIUM SERPL-MCNC: 9.6 MG/DL (ref 8.8–10.4)
CHLORIDE SERPL-SCNC: 104 MMOL/L (ref 98–107)
CREAT SERPL-MCNC: 1.18 MG/DL (ref 0.67–1.17)
EGFRCR SERPLBLD CKD-EPI 2021: 65 ML/MIN/1.73M2
EOSINOPHIL # BLD AUTO: 0.1 10E3/UL (ref 0–0.7)
EOSINOPHIL NFR BLD AUTO: 2 %
ERYTHROCYTE [DISTWIDTH] IN BLOOD BY AUTOMATED COUNT: 13.7 % (ref 10–15)
GLUCOSE SERPL-MCNC: 110 MG/DL (ref 70–99)
HCO3 SERPL-SCNC: 24 MMOL/L (ref 22–29)
HCT VFR BLD AUTO: 44.8 % (ref 40–53)
HGB BLD-MCNC: 15.1 G/DL (ref 13.3–17.7)
HOLD SPECIMEN: NORMAL
IMM GRANULOCYTES # BLD: 0 10E3/UL
IMM GRANULOCYTES NFR BLD: 0 %
LYMPHOCYTES # BLD AUTO: 0.9 10E3/UL (ref 0.8–5.3)
LYMPHOCYTES NFR BLD AUTO: 14 %
MCH RBC QN AUTO: 30.6 PG (ref 26.5–33)
MCHC RBC AUTO-ENTMCNC: 33.7 G/DL (ref 31.5–36.5)
MCV RBC AUTO: 91 FL (ref 78–100)
MONOCYTES # BLD AUTO: 0.7 10E3/UL (ref 0–1.3)
MONOCYTES NFR BLD AUTO: 10 %
NEUTROPHILS # BLD AUTO: 4.9 10E3/UL (ref 1.6–8.3)
NEUTROPHILS NFR BLD AUTO: 73 %
NRBC # BLD AUTO: 0 10E3/UL
NRBC BLD AUTO-RTO: 0 /100
NT-PROBNP SERPL-MCNC: 39 PG/ML (ref 0–900)
PLATELET # BLD AUTO: 251 10E3/UL (ref 150–450)
POTASSIUM SERPL-SCNC: 4 MMOL/L (ref 3.4–5.3)
PROT SERPL-MCNC: 6.7 G/DL (ref 6.4–8.3)
RBC # BLD AUTO: 4.94 10E6/UL (ref 4.4–5.9)
SODIUM SERPL-SCNC: 141 MMOL/L (ref 135–145)
WBC # BLD AUTO: 6.7 10E3/UL (ref 4–11)

## 2024-10-29 PROCEDURE — 250N000013 HC RX MED GY IP 250 OP 250 PS 637: Performed by: NURSE PRACTITIONER

## 2024-10-29 PROCEDURE — 99233 SBSQ HOSP IP/OBS HIGH 50: CPT | Performed by: NURSE PRACTITIONER

## 2024-10-29 PROCEDURE — 124N000001 HC R&B MH

## 2024-10-29 PROCEDURE — 36415 COLL VENOUS BLD VENIPUNCTURE: CPT | Performed by: NURSE PRACTITIONER

## 2024-10-29 PROCEDURE — 83880 ASSAY OF NATRIURETIC PEPTIDE: CPT | Performed by: NURSE PRACTITIONER

## 2024-10-29 PROCEDURE — 84155 ASSAY OF PROTEIN SERUM: CPT | Performed by: NURSE PRACTITIONER

## 2024-10-29 PROCEDURE — 99232 SBSQ HOSP IP/OBS MODERATE 35: CPT | Performed by: NURSE PRACTITIONER

## 2024-10-29 PROCEDURE — 83036 HEMOGLOBIN GLYCOSYLATED A1C: CPT | Performed by: NURSE PRACTITIONER

## 2024-10-29 PROCEDURE — 85004 AUTOMATED DIFF WBC COUNT: CPT | Performed by: NURSE PRACTITIONER

## 2024-10-29 RX ORDER — LURASIDONE HYDROCHLORIDE 20 MG/1
20 TABLET, FILM COATED ORAL
Status: DISCONTINUED | OUTPATIENT
Start: 2024-10-29 | End: 2024-11-05 | Stop reason: HOSPADM

## 2024-10-29 RX ORDER — DULOXETIN HYDROCHLORIDE 30 MG/1
60 CAPSULE, DELAYED RELEASE ORAL EVERY MORNING
Status: DISCONTINUED | OUTPATIENT
Start: 2024-10-30 | End: 2024-11-05 | Stop reason: HOSPADM

## 2024-10-29 RX ADMIN — ROSUVASTATIN CALCIUM 20 MG: 10 TABLET, FILM COATED ORAL at 20:10

## 2024-10-29 RX ADMIN — ASPIRIN 81 MG: 81 TABLET, COATED ORAL at 08:53

## 2024-10-29 RX ADMIN — GABAPENTIN 400 MG: 400 CAPSULE ORAL at 14:16

## 2024-10-29 RX ADMIN — FUROSEMIDE 40 MG: 20 TABLET ORAL at 08:53

## 2024-10-29 RX ADMIN — EMPAGLIFLOZIN 10 MG: 10 TABLET, FILM COATED ORAL at 08:53

## 2024-10-29 RX ADMIN — QUETIAPINE FUMARATE 25 MG: 25 TABLET ORAL at 20:10

## 2024-10-29 RX ADMIN — DULOXETINE HYDROCHLORIDE 90 MG: 30 CAPSULE, DELAYED RELEASE PELLETS ORAL at 08:53

## 2024-10-29 RX ADMIN — LURASIDONE HYDROCHLORIDE 20 MG: 20 TABLET, COATED ORAL at 16:59

## 2024-10-29 RX ADMIN — DOCUSATE SODIUM 100 MG: 100 CAPSULE, LIQUID FILLED ORAL at 08:53

## 2024-10-29 RX ADMIN — Medication 1 TABLET: at 08:53

## 2024-10-29 RX ADMIN — TOLTERODINE 4 MG: 4 CAPSULE, EXTENDED RELEASE ORAL at 08:53

## 2024-10-29 RX ADMIN — LISINOPRIL 20 MG: 20 TABLET ORAL at 08:53

## 2024-10-29 RX ADMIN — GABAPENTIN 400 MG: 400 CAPSULE ORAL at 20:10

## 2024-10-29 RX ADMIN — Medication 100 MG: at 08:53

## 2024-10-29 RX ADMIN — METOPROLOL SUCCINATE 25 MG: 25 TABLET, EXTENDED RELEASE ORAL at 08:53

## 2024-10-29 RX ADMIN — FLUTICASONE FUROATE AND VILANTEROL TRIFENATATE 1 PUFF: 100; 25 POWDER RESPIRATORY (INHALATION) at 08:53

## 2024-10-29 RX ADMIN — GABAPENTIN 400 MG: 400 CAPSULE ORAL at 08:53

## 2024-10-29 ASSESSMENT — ACTIVITIES OF DAILY LIVING (ADL)
HYGIENE/GROOMING: INDEPENDENT
ORAL_HYGIENE: INDEPENDENT
ADLS_ACUITY_SCORE: 0
LAUNDRY: UNABLE TO COMPLETE
ADLS_ACUITY_SCORE: 0
DRESS: SCRUBS (BEHAVIORAL HEALTH)
ADLS_ACUITY_SCORE: 0
ADLS_ACUITY_SCORE: 0
LAUNDRY: UNABLE TO COMPLETE
DRESS: INDEPENDENT;SCRUBS (BEHAVIORAL HEALTH)
ORAL_HYGIENE: INDEPENDENT
ADLS_ACUITY_SCORE: 0
HYGIENE/GROOMING: INDEPENDENT
ADLS_ACUITY_SCORE: 0

## 2024-10-29 NOTE — PROGRESS NOTES
Attempted OT eval today.  Pt in group and requests to continue to attend group.  Is willing to meet with OT tomorrow for eval.

## 2024-10-29 NOTE — PLAN OF CARE
Face to face end of shift report received from Sabina VAUGHAN RN. Rounding completed. Patient observed in [facility].     Pt has been calm, cooperative this shift. He participates in the unit's milieu with appropriate behaviors. He denied any SI/HI and AH/VH. Denied pain. Pt attends groups with active participation. Mood continues to be depressed, [facility] and is hopeful for [facility]. He is able to make his needs known. Steady gait- no falls. Frequent rounding.,     Problem: Adult Behavioral Health Plan of Care  Goal: Patient-Specific Goal (Individualization)  Description: Patient will sleep 6 to 8 hours per night  Patient will eat at least 50% of meals  Patient will attend at least 50% of groups  Patient will comply with recommendations of treatment team  Patient will remain medication compliant      Outcome: Progressing     Problem: Depression  Goal: Decreased Depression Symptoms  Description: Patient will verbalize a decrease in depression, and increase in mood by discharge.  Outcome: Progressing     Problem: Suicide Risk  Goal: Absence of Self-Harm  Description: Patient will be free of suicidal ideation or intent by discharge.  Outcome: Progressing       Maxine Miramontes RN  10/29/2024  8:59 AM

## 2024-10-29 NOTE — PROGRESS NOTES
[facility] PSYCHIATRY  PROGRESS NOTE     SUBJECTIVE     Prior to interviewing the patient, I met with nursing and reviewed patient's clinical condition. We discussed clinical care both before and after the interview. I have reviewed the patient's clinical course by review of records including previous notes, labs, and vital signs.     Per nursing, the patient had the following behavioral events over the last 24-hours: none.    On psychiatric interview, patient is seen in his room. Reports he is very depressed, feels the same today as he did yesterday. We discuss his past and he has felt depressed his entire life, back to elementary school. His mother sent him to a psychologist when he was in 6th grade but he didn't know why and when he asked her later in life, she didn't remember. His father and grandfather also struggled with significant depression, remembering his grandfather wouldn't talk for days at a time when he was depressed.     Reports last feeling good and had some hope was when he had the CABG in July 2024. Has always had this underlying depression, continues to feel like he doesn't have purpose and has nothing to do with his life. If he thinks about it too much, he becomes hopeless. Discussing the impact alcohol has had on his life, his insight is limited. He can recall how he got ill after getting drunk and nearly dying, however isn't able to see the impact of his life long use.     Reviewed past medications, he's been on numerous SSRIs, SNRIs and Abilify. He does not recall names of medications outside of valium and cymbalta. Reports usually SNRIs would work for a short period of time then would go back to feeling very depressed, otherwise no benefit from SSRIs, doesn't recall how Abilify worked as he resumed alcohol use.    OT noted potential confusion regarding time in groups and when events were occurring.        MEDICATIONS   Scheduled Meds:  Current Facility-Administered Medications    Medication Dose Route Frequency Provider Last Rate Last Admin    aspirin EC tablet 81 mg  81 mg Oral QAM RedKilo maddena, CNP   81 mg at 10/29/24 0853    docusate sodium (COLACE) capsule 100 mg  100 mg Oral Daily RedFatmata madden, CNP   100 mg at 10/29/24 0853    DULoxetine (CYMBALTA) DR capsule 90 mg  90 mg Oral QAM Gunjan Lucas, NP   90 mg at 10/29/24 0853    empagliflozin (JARDIANCE) tablet 10 mg  10 mg Oral QAM RedKilo maddena, CNP   10 mg at 10/29/24 0853    fluticasone-vilanterol (BREO ELLIPTA) 100-25 MCG/ACT inhaler 1 puff  1 puff Inhalation QAM RedKilo maddena CNP   1 puff at 10/29/24 0853    furosemide (LASIX) tablet 40 mg  40 mg Oral QAM RedKilo maddena, CNP   40 mg at 10/29/24 0853    gabapentin (NEURONTIN) capsule 400 mg  400 mg Oral TID Gunjan Lucas NP   400 mg at 10/29/24 0853    lisinopril (ZESTRIL) tablet 20 mg  20 mg Oral QAM RedCindi maddenesa, CNP   20 mg at 10/29/24 0853    metoprolol succinate ER (TOPROL XL) 24 hr tablet 25 mg  25 mg Oral QAM RedKilo maddena, CNP   25 mg at 10/29/24 0853    multivitamin w/minerals (THERA-VIT-M) tablet 1 tablet  1 tablet Oral QAM RedKilo maddena CNP   1 tablet at 10/29/24 0853    QUEtiapine (SEROquel) tablet 25 mg  25 mg Oral At Bedtime Kalani Teran APRN CNP   25 mg at 10/28/24 2007    rosuvastatin (CRESTOR) tablet 20 mg  20 mg Oral At Bedtime Kilo Wilcoxa, CNP   20 mg at 10/28/24 2007    thiamine tablet 100 mg  100 mg Oral QAM RedCindi maddenesa, CNP   100 mg at 10/29/24 0853    tolterodine ER (DETROL LA) 24 hr capsule 4 mg  4 mg Oral QAM RedKilo maddena, CNP   4 mg at 10/29/24 0853     PRN Meds:.  Current Facility-Administered Medications   Medication Dose Route Frequency Provider Last Rate Last Admin    acetaminophen (TYLENOL) tablet 650 mg  650 mg Oral Q4H PRN Isaiah Elizabeth APRN CNP        albuterol (PROVENTIL HFA/VENTOLIN HFA) inhaler  2 puff Inhalation Q4H PRN Fatmata Wilcox CNP   2 puff at 10/25/24 1242    alum & mag hydroxide-simethicone (MAALOX) suspension 30 mL   30 mL Oral Q4H PRN Isaiah Elizabeth APRN CNP        calcium carbonate (TUMS) chewable tablet 500 mg  500 mg Oral Daily PRN Kalani Teran APRN CNP        hydrOXYzine HCl (ATARAX) tablet 25 mg  25 mg Oral 4x Daily PRN Gunjan Lucas NP   25 mg at 10/25/24 2131    melatonin tablet 3 mg  3 mg Oral At Bedtime PRN Isaiah Elizabeth APRN CNP   3 mg at 10/25/24 2131    nicotine (NICORETTE) gum 2 mg  2 mg Buccal Q1H PRN Isaiah Elizabeth APRN CNP        nitroGLYcerin (NITROSTAT) sublingual tablet 0.4 mg  0.4 mg Sublingual Q5 Min PRN Fatmata Wilcox CNP        OLANZapine (zyPREXA) tablet 2.5 mg  2.5 mg Oral TID PRN Isaiah Elizabeth APRN CNP        Or    OLANZapine (zyPREXA) injection 2.5 mg  2.5 mg Intramuscular TID PRN Isaiah Elizabeth APRN CNP        pantoprazole (PROTONIX) EC tablet 40 mg  40 mg Oral Daily PRN Fatmata Wilcox CNP            ALLERGIES   No Known Allergies     MENTAL STATUS EXAM   Vitals: /78   Pulse 75   Temp 97.1  F (36.2  C) (Temporal)   Resp 16   Wt 107.8 kg (237 lb 9.6 oz)   SpO2 96%   BMI 32.22 kg/m      Appearance:  awake, alert, unkempt, and disheveled   Attitude:  cooperative  Eye Contact:  good  Mood:  depressed  Affect:  mood congruent  Speech:  clear, coherent and slight SOB noted with speaking  Psychomotor Behavior:  no evidence of tardive dyskinesia, dystonia, or tics  Thought Process:  logical and linear  Associations:  no loose associations  Thought Content:  no evidence of psychotic thought and passive suicidal ideation present - no plan or intent, hopelessness evident  Insight:  limited  Judgment:  limited  Oriented to:  time, person, and place  Attention Span and Concentration:  fair  Recent and Remote Memory:  limited/fair  Fund of Knowledge: appropriate  Muscle Strength and Tone: normal  Gait and Station: Normal         LABS   No results found for this or any previous visit (from the past 24 hours).      IMPRESSION   This is a 74 year old male with a PMH of MDD, CHELSIE, and alcohol  "abuse who presents to the ED for evaluation of anxiety and depression. Patient reported taking three tablets of his Ativan this morning in an attempt to help his anxiety, though started to feel tired and weak. Tried to get into [facility] though they were full. Today patient denies any SI. He reports feeling more depressed and anxiety over past few weeks, more so the past few days. He notes that he feels very isolated and lonely since retiring from work as a . He has no family or children. He reports feeling he has \"no purpose\". Patient notes that he is interested in increasing his mental health services and looking into volunteer options. He has longer term plan of getting into an assisted living. Is in agreement to continue Cymbalta, Melatonin, Gabapentin. He does not want to continue Ativan and is aware of the risks in taking this medication given his age and his ongoing alcohol use. He was not taking Naltrexone at home, though would be interested in restarting this prior to discharge. Patient is in agreement to voluntary admission for stabilization. He notes that he may be interested in stepping down from the hospital to [facility] prior to return home, can look into this closer to discharge.     Today: Continues with passive SI. CD assessment pending, will order OT evaluation regarding cognitive fx. Discussed B/R/SE with Jeffrey including sedation, appetite changes, weight gain, metabolic syndrome, akathisia, dystonia, and movement disorders such as tardive dyskinesia and he's agreeable to start Latuda.      DIAGNOSES     #.Major depressive disorder, recurrent, severe  #. Alcohol use disorder, severe  #. Generalized anxiety disorder       PLAN     Location: Unit 5  Legal Status: Orders Placed This Encounter      Voluntary    Safety Assessment:    Behavioral Orders   Procedures    Code 1 - Restrict to Unit    Routine Programming     As clinically indicated    Status 15     Every 15 minutes.      PTA " psychotropic medications stopped:     -    PTA psychotropic medications continued/changed:     - duloxetine 90 mg qAM -> reduced to 60 mg 10/29    New medications tried and stopped:   Valium 5 mg BID X 2 doses.    New medications initiated:   Seroquel 25 mg at hs  Latuda 20 mg with supper -10/29    Today's Changes:   - CD eval pending   - OT eval ordered   - Latuda 20 mg with supper, will advise to increase in 1 week    Programming: Patient will be treated in a therapeutic milieu with appropriate individual and group therapies. Education will be provided on diagnoses, medications, and treatments.     Medical diagnoses:  Per medicine    Consult: CD evaluation, OT evaluation - cognitive  Tests: None    Anticipated LOS: 3-5 days  Disposition: home with services.       TREATMENT TEAM CARE PLAN     Progress: Continued symptoms.    Continued Stay Criteria/Rationale: Continued symptoms without sufficient improvement/resolution.    Medical/Physical: See above.    Precautions: See above.     Plan: Continue inpatient care with unit support and medication management.    Rationale for change in precautions or plan: NA due to no change.    Participants: YOSSI Callejas, DEAN-BC, FNP-C    The patient's care was discussed with the treatment team and chart notes were reviewed.       ATTESTATION      YOSSI Callejas, DEAN-BC, FNP-C

## 2024-10-29 NOTE — PLAN OF CARE
Problem: Adult Behavioral Health Plan of Care  Goal: Patient-Specific Goal (Individualization)  Description: Patient will sleep 6 to 8 hours per night  Patient will eat at least 50% of meals  Patient will attend at least 50% of groups  Patient will comply with recommendations of treatment team  Patient will remain medication compliant  Outcome: Progressing     Problem: Depression  Goal: Decreased Depression Symptoms  Description: Patient will verbalize a decrease in depression, and increase in mood by discharge.  Outcome: Progressing     Problem: Suicide Risk  Goal: Absence of Self-Harm  Description: Patient will be free of suicidal ideation or intent by discharge.  Outcome: Progressing    Face to face shift report received from previously assigned nurse. Rounding completed, pt observed participating in group.    Patient is met with in the lounge. Denies pain, SI, HI, and A/V hallucinations. Endorses anxiety, declines interventions at this time. Endorses depression and loneliness, although does state he appreciate the resources we have offered him. Patient appears to be attending groups and slightly more interactive with peers than previous. Compliant with scheduled medication administration. No further needs at this time.     Remains social in the lounge with peers while watching television. Compliant with HS medication administration. No further needs at this time.     Face to face report communicated to oncjody CHEEMA.    Harriet Huff RN  10/29/2024  5:10 PM

## 2024-10-29 NOTE — PROGRESS NOTES
[facility]    Medical Services Progress Note    Date of Service (when I saw the patient): 10/29/2024    Assessment & Plan     Principal Problem:    Depression with anxiety     Active Medical Problems:    Essential hypertension- vitals stable. Denies chest pain. Reports chronic SOB, hx of COPD. Not being using his maintenance inhaler.  Home dose of lisinopril  and metoprolol ordered.   10/29- denies chest pain,  denies feeling sob. Vitals stable.        COPD (chronic obstructive pulmonary disease) (H)-denies chest pain. Reports chronic SOB. He reports yesterday he was very SOB but states he feels much better this morning. States that especially after using his Breo inhaler today he feels much better. Albuterol inhaler order as needed. Lungs are clear bilaterally. Nursing to continue to monitor.    10/29- Denies chest pain, sob. Using maintenance inhaler daily. Has only utilized albuterol as needed one time on the 25th. Lungs clear bilaterally.       Esophageal reflux- denies GERD symptoms. Takes omeprazole as needed. Formulary substitute protonix ordered.   10/29- denies GERD symptoms.        Hyperlipidemia- home dose of crestor ordered.        Obstructive sleep apnea- repots he is suppose to use a cpap and stopped using it a few months ago. He may use a bed wedge.        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 Denies chest pain, reports chronic sob. Home dose of aspirin, metoprolol, lisinopril, crestor, jardiance, nitroglycerin ordered.      Chronic lower extremity edema- 1-2 + bilateral lower extremity edema, hx of heart failure, CABG x 4. He uses ANGY hose and requests to use his home ones because they are more comfortable and fit him well. Nursing did wash these and order placed for him to use from 7am-7pm. Nursing staff will assist with applying and removing. Home dose of lasix ordered.   10-29- Wearing ANGY hose. He reports he has had them in place for 24 hours. Bilateral lower extremity trace edema  "noted. Nursing to remove Darwin hose after 12 hours and leave off 12 hours.     Diastolic CHF- its noted this diagnosis could be in part related to recent vein harvest/surgery. Only trace bilateral lower extremity edema noted. Pt states \"I don't know if it's as much short of breath as much as being anxious\". \"People tell me I am short of breath and I don't feel short of breath\". Lungs clear bilaterally. BNP 39 which is not indicate a CHF exacerbation. CBC wnl, CMP shows a mild increase in creatinine and bun. Creatinine 1.12 on admission and  today 1.18. Pt encouraged to increase water  intake. Will recheck renal function in a couple days. Chest xray shows The cardiac silhouette is normal in size. There is irregular distribution of pulmonary vascularity suspicious for emphysema. There  is some linear opacities in both lungs most likely representing scarring. Postoperative changes are seen in the mediastinum. No pleural effusion or pneumothorax. IMPRESSION: Linear atelectasis or scarring in both lungs.Possible emphysema.O2 saturation is stable at 96% on room air. Bp is stable. Will add 2 gram Na restriction, daily weights. Continue with inhalers. Nursing to continue to monitor and consult for new or worsening symptoms.                                                                           Code Status: Full Code.    Fatmata Wilcox CNP        -Data reviewed today: I reviewed all new labs and imaging results over the last 24 hours.     Physical Exam   Temp: 97.1  F (36.2  C) Temp src: Temporal BP: 118/78 Pulse: 75   Resp: 16 SpO2: 96 % O2 Device: None (Room air)    Vitals:    10/26/24 0800   Weight: 107.8 kg (237 lb 9.6 oz)     Vital Signs with Ranges  Temp:  [96.8  F (36  C)-97.5  F (36.4  C)] 97.1  F (36.2  C)  Pulse:  [66-84] 75  Resp:  [14-16] 16  BP: (105-136)/(65-78) 118/78  SpO2:  [92 %-96 %] 96 %  No intake/output data recorded.    Constitutional: awake, alert, cooperative, no apparent distress, and appears stated " age, vitals stable  Respiratory: No increased work of breathing, good air exchange, clear to auscultation bilaterally, no crackles or wheezing  Cardiovascular: Normal apical impulse, regular rate and rhythm, normal S1 and S2, no S3 or S4, and no murmur noted  Skin: trace edema to bilateral lower extremities otherwise, no redness, warmth, and no rashes  Neuropsychiatric: General: anxious, calm, and normal eye contact    Medications   Current Facility-Administered Medications   Medication Dose Route Frequency Provider Last Rate Last Admin    aspirin EC tablet 81 mg  81 mg Oral QAM Kilo Wilcoxa, CNP   81 mg at 10/29/24 0853    docusate sodium (COLACE) capsule 100 mg  100 mg Oral Daily RedKilo maddena, CNP   100 mg at 10/29/24 0853    DULoxetine (CYMBALTA) DR capsule 90 mg  90 mg Oral QAM Gunjan Lucas, NP   90 mg at 10/29/24 0853    empagliflozin (JARDIANCE) tablet 10 mg  10 mg Oral QAM RedKilo maddena, CNP   10 mg at 10/29/24 0853    fluticasone-vilanterol (BREO ELLIPTA) 100-25 MCG/ACT inhaler 1 puff  1 puff Inhalation QAM RedKilo maddena, CNP   1 puff at 10/29/24 0853    furosemide (LASIX) tablet 40 mg  40 mg Oral QAM Kilo Wilcoxa, CNP   40 mg at 10/29/24 0853    gabapentin (NEURONTIN) capsule 400 mg  400 mg Oral TID Gunjan Lucas, NP   400 mg at 10/29/24 0853    lisinopril (ZESTRIL) tablet 20 mg  20 mg Oral QAM Kilo Wilcoxa, CNP   20 mg at 10/29/24 0853    metoprolol succinate ER (TOPROL XL) 24 hr tablet 25 mg  25 mg Oral QAM RedCindi maddenesa, CNP   25 mg at 10/29/24 0853    multivitamin w/minerals (THERA-VIT-M) tablet 1 tablet  1 tablet Oral QAM Kilo Wilcoxa, CNP   1 tablet at 10/29/24 0853    QUEtiapine (SEROquel) tablet 25 mg  25 mg Oral At Bedtime Kalani Teran APRN CNP   25 mg at 10/28/24 2007    rosuvastatin (CRESTOR) tablet 20 mg  20 mg Oral At Bedtime Fatmata Wilcox CNP   20 mg at 10/28/24 2007    thiamine tablet 100 mg  100 mg Oral QAFatmata Luis CNP   100 mg at 10/29/24 0853    tolterodine ER  (DETROL LA) 24 hr capsule 4 mg  4 mg Oral Fatmata Velasquez CNP   4 mg at 10/29/24 0853       Data   Recent Labs   Lab 10/29/24  1028 10/24/24  1421   WBC 6.7 5.8   HGB 15.1 14.5   MCV 91 90    233   INR  --  1.02   NA  --  136   POTASSIUM  --  4.0   CHLORIDE  --  101   CO2  --  21*   BUN  --  18.4   CR  --  1.12   ANIONGAP  --  14   SIDDHARTH  --  9.3   GLC  --  111*   ALBUMIN  --  4.3   PROTTOTAL  --  6.8   BILITOTAL  --  0.5   ALKPHOS  --  72   ALT  --  72*   AST  --  38   LIPASE  --  25       No results found for this or any previous visit (from the past 24 hours).

## 2024-10-29 NOTE — PROGRESS NOTES
Type Of Assessment: Inpatient Substance Use Comprehensive Assessment    Referral Source:  Brook Rodriguez at [facility]  MRN: [id number]     DATE OF SERVICE: 2024  Date of previous CAROL Assessment: Last assessment at [facility] in [place]  Patient confirmed identity through two factor verification: Full Legal Name and     PATIENT'S NAME: Jeffrey King  Age: 74 year old  Last 4 SSN: [id number]  Sex: Male  Gender Identity: male  Sexual Orientation: Heterosexual  Cultural Background: No, Denies any cultural influences or concerns that need to be considered for treatment  YOB: 1950  Current Address:   [address] [place] [address] [address] [address]  Patient Phone Number: [phone] (home)    Patient's E-Mail Contact:  No e-mail address on record  Funding: [email or web address]   PMI: N/A  Emergency Contact: Ayde Giles (ex girlfriend) and Manuela King (niece)    ANDREA notification of data collection was provided to patient. File copy placed in chart in nursing station.     START TIME: 10:50 AM  END TIME: 11:47 AM     Jeffrey King was seen for a substance use disorder consult by MAURICIO Plaza.    Reason for Substance Use Disorder Consult:  Provider placed order for consult. Patient currently admitted to [facility]. Endorses history of alcohol use and tested negative at admission to [place] ED on 10/24/24. Per chart review was seen at [facility] in [place] on 2024 and tested positive for alcohol and has had additional ED admissions in  due to alcohol use including discharge with recommendation to admit to detox. Endorses having prior treatments for CD.     Are you currently having severe withdrawal symptoms that are putting yourself or others in danger? No  Are you currently having severe medical problems that require immediate attention? No  Are you currently having severe emotional or behavioral problems that are putting yourself or  others at risk of harm? No    Have you participated in prior substance use disorder evaluations? Yes. When, Where, and What circumstances: Prior to treatment programming. Assessment locations include [place] at [facility] and [facility] in [place].   Have you ever been to detox, inpatient or outpatient treatment for substance related use? List previous treatment: Yes. When, Where, and What circumstances: Prior treatment history includes [facility] 1 month residential, [place] 1 month residential, Outpatient in [place], [facility] 1 month residential (attended 3 times), and [facility] in [place]. [facility]      Have you ever had a gambling problem or had treatment for compulsive gambling? No  Have you ever felt the need to bet more and more money? No  Have you ever shad to lie to people important to you about how much you gambled? No    Patient does not appear to be in severe withdrawal, an imminent safety risk to self or others, or requiring immediate medical attention and may proceed with the assessment interview.    Comprehensive Substance Use History   X X = Primary Drug Used Age of First Use    Pattern of Substance Use   (heaviest use in life and a use history within the past year if applicable)  Last date of use   and quantity of last use if within the past 30 days Withdrawal Potential?   Method of use  (Oral, smoked, snorted, IV, etc)   X Alcohol   23 Started using in college at age 23. 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      Last year reports intermittent varied use. 3-4 day binges followed by a break of 1-3 weeks. Has had at least 3 periods excessive use.     Heaviest use was the year prior to the current year. Pt reports being in [facility] and drinking up to a bottle (fifth) per day.  Last used alcohol approximately 10 days ago on 10/19/24    Per chart review was negative for alcohol at the  "admission.      No Oral    Marijuana/Hashish   No use        Cocaine/Crack No use        Meth/Amphetamines   No use        Heroin   No use        Other Opiates/Synthetics   No use        Inhalants  No use        Benzodiazepines   No use        Hallucinogens   No use        Barbiturates/Sedatives/Hypnotics   No use        Over-the-Counter Drugs   No use        Other   No use        Nicotine    Patient has used cigarettes occasionally previously.  No use in the last twelve months No Smoked     Withdrawal symptoms as noted during interview and/or via chart the last year include the following:  Unable to Sleep  Fatigue / Extremely Tired  Sad / Depressed Feeling  Nausea / Vomiting  Dizziness  Confused / Disrupted Speech  Anxiety / Worried    Have you experienced any cravings?  Denies cravings then reports that using is to address symptoms of anxiety and depression. Patient commented \"maybe that would be a craving\" during the explanation as well.     Have you had periods of abstinence?  Yes. Over the last year has had multiple periods of 2-3 weeks of sobriety and one period of about 4 months surrounding heart surgery. Patient binge drinks when consuming alcohol.     What was your longest period of abstinence? 4 months in the last year right before, during, and immediately after having heart surgery. Patient reports significant alcohol use started in the last 5-6 years following half-way. Prior to half-way had periods of years whereby he was sober and able to abstain.     Any circumstances that lead to relapse? [facility] and \"not knowing what to do with myself\" have contributed. Patient primarily drinks when alone at home. Verbalized loneliness and thoughts about the past and future as contributing. Specific thoughts are fears surrounding death and regrets about not having  and/or having children. [facility] appears to have removed a significant meaningful activity from patients daily life.    What activities " "have you engaged in when using alcohol/other drugs that could be hazardous to you or others?  The patient reported having a history of driving while under the influence of alcohol. Stated that when driving having \"thought I was sober, but I was not sober\" and explained this was due to having consumed quantities large enough to have been under the influence. Has occurred within the last 12 months.    A description of any risk-taking behavior, including behavior that puts the client at risk of exposure to blood-borne or sexually transmitted diseases: None identified.     Arrests and legal interventions and relationship to substance use: None reported.    A description of how the patient's use affected their ability to function appropriately in a work setting: Patient denied but also stated \"probably but I did not realize it and maybe came in hung over\" when responding. Patient indicated significant use commenced post senior living in 2018.     A description of how the patient's use affected their ability to function appropriately in an educational setting: Patient denied any affect to education. Significant alcohol use post dated college and law school.     Leisure time activities that are associated with substance use: Patient drinks while sitting at home alone while watching TV.     Do you think your substance use has become a problem for you? He agrees he has a substance abuse problem.    MEDICAL HISTORY  Physical or medical concerns or diagnoses: Patient reports having heart surgery in the past year during the interview.     Assessment and plan per medical history and physical:    Principal Problem:    Depression with anxiety     Active Medical Problems:    Essential hypertension- vitals stable. Denies chest pain. Reports chronic SOB, hx of COPD. Not being using his maintenance inhaler.  Home dose of lisinopril  and metoprolol ordered.        COPD (chronic obstructive pulmonary disease) (H)-denies chest pain. Reports " chronic SOB. He reports yesterday he was very SOB but states he feels much better this morning. States that especially after using his Breo inhaler today he feels much better. Albuterol inhaler order as needed. Lungs are clear bilaterally. Nursing to continue to monitor.        Esophageal reflux- denies GERD symptoms. Takes omeprazole as needed. Formulary substitute protonix ordered.        Hyperlipidemia- home dose of crestor ordered.        Obstructive sleep apnea- repots he is suppose to use a cpap and stopped using it a few months ago. He may use a bed wedge.        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 Denies chest pain, reports chronic sob. Home dose of aspirin, metoprolol, lisinopril, crestor, jardiance, nitroglycerin ordered.      Chronic lower extremity edema- 1-2 + bilateral lower extremity edema, hx of heart failure, CABG x 4. He uses ANGY hose and requests to use his home ones because they are more comfortable and fit him well. Nursing did wash these and order placed for him to use from 7am-7pm. Nursing staff will assist with applying and removing. Home dose of lasix ordered.     Do you have any current medical treatment needs not being addressed by inpatient treatment?  No    Do you need a referral for a medical provider? Patient currently has access to primary care at [facility].     Current medications: Patient reports current meds as:     Current Facility-Administered Medications   Medication Dose Route Frequency Provider    acetaminophen (TYLENOL) tablet 650 mg  650 mg Oral Q4H PRN Isaiah Elizabeth APRN CNP    albuterol (PROVENTIL HFA/VENTOLIN HFA) inhaler  2 puff Inhalation Q4H PRN Fatmata Wilcox CNP    alum & mag hydroxide-simethicone (MAALOX) suspension 30 mL  30 mL Oral Q4H PRN Isaiah Elizabeth APRN CNP    aspirin EC tablet 81 mg  81 mg Oral QAM Fatmata Wilcox CNP    calcium carbonate (TUMS) chewable tablet 500 mg  500 mg Oral Daily PRN Kalani Teran APRN CNP    docusate sodium (COLACE)  capsule 100 mg  100 mg Oral Daily Fatmata Wilcox CNP    [START ON 10/30/2024] DULoxetine (CYMBALTA) DR capsule 60 mg  60 mg Oral QAM Brook Rodriguez APRN CNP    empagliflozin (JARDIANCE) tablet 10 mg  10 mg Oral QAM Fatmata Wilcox CNP    fluticasone-vilanterol (BREO ELLIPTA) 100-25 MCG/ACT inhaler 1 puff  1 puff Inhalation QAM Fatmata Wilcox CNP    furosemide (LASIX) tablet 40 mg  40 mg Oral QAM Fatmata Wilcox CNP    gabapentin (NEURONTIN) capsule 400 mg  400 mg Oral TID Gunjan Lucas NP    hydrOXYzine HCl (ATARAX) tablet 25 mg  25 mg Oral 4x Daily PRN Gunjan Lucas NP    lisinopril (ZESTRIL) tablet 20 mg  20 mg Oral QAM Fatmata Wilcox CNP    lurasidone (LATUDA) tablet 20 mg  20 mg Oral Daily with supper Brook Rodriguez APRN CNP    melatonin tablet 3 mg  3 mg Oral At Bedtime PRN Isaiah Elizabeth APRN CNP    metoprolol succinate ER (TOPROL XL) 24 hr tablet 25 mg  25 mg Oral QAM Fatmata Wilcox CNP    multivitamin w/minerals (THERA-VIT-M) tablet 1 tablet  1 tablet Oral QAM Fatmata Wilcox CNP    nicotine (NICORETTE) gum 2 mg  2 mg Buccal Q1H PRN Isaiah Elizabeth APRN CNP    nitroGLYcerin (NITROSTAT) sublingual tablet 0.4 mg  0.4 mg Sublingual Q5 Min PRN Fatmata Wilcox CNP    OLANZapine (zyPREXA) tablet 2.5 mg  2.5 mg Oral TID PRN Isaiah Elizabeth APRN CNP    Or    OLANZapine (zyPREXA) injection 2.5 mg  2.5 mg Intramuscular TID PRN Isaiah Elizabeth APRN CNP    pantoprazole (PROTONIX) EC tablet 40 mg  40 mg Oral Daily PRN Fatmata Wilcox CNP    QUEtiapine (SEROquel) tablet 25 mg  25 mg Oral At Bedtime Kalani Teran APRN CNP    rosuvastatin (CRESTOR) tablet 20 mg  20 mg Oral At Bedtime Fatmata Wilcox CNP    thiamine tablet 100 mg  100 mg Oral NAELM Fatmata Wilcox CNP    tolterodine ER (DETROL LA) 24 hr capsule 4 mg  4 mg Oral NAELM Fatmata Wilcox CNP     Are you pregnant? NA, Male    Do you have any specific physical needs/accommodations? Patient reports no specific accommodations. Patient endorses not walking  "or doing as much activity as he should and being unstable at times when walking.     MENTAL HEALTH HISTORY:  Have you ever had  hospitalizations or treatment for mental health illness: Yes. When, Where, and What circumstances: Patient is currently hospitalized at [facility]. Patient has had prior mental health hospitalizations, ECT, crisis stabilization, and outpatient services including therapy and medication management. Patient was seeing a therapist in [place] who has \"fired\" him and is transitioning to a new therapist in [place] at [facility]. Patient will be driving to those appointments. Reports will drive as needed to avoid attending appointments virtually.    Mental health history, including diagnosis and symptoms, and the effect on the client's ability to function: Psychiatric history per the psychiatry history and physical:    Has been hospitalized 10+ years ago at [facility] and [facility] for depression, anxiety. Does have history of ECT in [place] in 2014, also had in the 90s. Currently no psychiatry, does report just recently starting to see a therapist at [facility]. Has been to [facility] for crisis stabilization in the past. Past medications include Wellbutrin, Abilify, Lexapro, Vistaril, Naltrexone, Campral, Zoloft, Tranxene, Seroquel, Geodon, Buspar, Lamictal, Klonopin, Celexa, Ativan, Cymbalta, Gabapentin, Naltrexone, Melatonin, as well as likely others.       Current mental health treatment including psychotropic medication needed to maintain stability: (Note: The assessment must utilize screening tools approved by the commissioner pursuant to section 245.4863 to identify whether the client screens positive for co-occurring disorders): Currently admitted to behavioral health unit and medications are listed above.     Assessment per the psychiatry H&P:    This is a 74 year old male with a PMH of MDD, CHELSIE, and alcohol abuse who presents to the " "ED for evaluation of anxiety and depression. Patient reported taking three tablets of his Ativan this morning in an attempt to help his anxiety, though started to feel tired and weak. Tried to get into [facility] though they were full. Today patient denies any SI. He reports feeling more depressed and anxiety over past few weeks, more so the past few days. He notes that he feels very isolated and lonely since retiring from work as a . He has no family or children. He reports feeling he has \"no purpose\". Patient notes that he is interested in increasing his mental health services and looking into volunteer options. He has longer term plan of getting into an assisted living. Is in agreement to continue Cymbalta, Melatonin, Gabapentin. He does not want to continue Ativan and is aware of the risks in taking this medication given his age and his ongoing alcohol use. He was not taking Naltrexone at home, though would be interested in restarting this prior to discharge. Patient is in agreement to voluntary admission for stabilization. He notes that he may be interested in stepping down from the hospital to [facility] prior to return home, can look into this closer to discharge.     GAIN-SS Tool: Responses from patient during assessment:    When was the last time that you had significant problems...  With feeling very trapped, lonely, sad, blue, depressed or hopeless about the future? Past Month  With sleep trouble, such as bad dreams, sleeping restlessly, or falling asleep during the day? Past Month  With feeling very anxious, nervous, tense, scared, panicked or like something bad was going to happen? Past Month   With becoming very distressed & upset when something reminded you of the past? Past Month   With thinking about ending your life or committing suicide? Never         When was the last time that you did the following things 2 or more times?  Lied or conned to get things you wanted or to avoid having to do " something? 1+ years ago   Had a hard time paying attention at school, work or home? Past Month  Had a hard time listening to instructions at school, work or home? Never  Were a bully or threatened other people? Never   Started physical fights with other people? Never    Patient was tearful when responding to the GAINSS and to the questions below regarding significant relationship and children. Patient reported being afraid of dying and that thinking about the end of life causes significant anxiety. Patient endorses distress and becoming upset about the past and commented that this comes from not having  nor having children which is now a cause of sadness and also creates anxiety.    Have you ever been verbally, emotionally, physically or sexually abused?   No    Family history of substance use and misuse: Parents consumed alcohol. Mother was an alcoholic. Father addressed alcohol issues. Patient had a grandfather and aunt who also had issues with alcohol.     The patient's desire for family involvement in the treatment program: Immediate family has passed away. The only living relatives are 2 nieces. Patient has a connection with 1 of these nieces who lives near [place].    Social network in relation to expected support for recovery: Has a housemate with 15 years of sobriety. Reports that housemate does have some cognitive challenges. Patient has limited contact with friends and usually attends a lunch once a week in the community. Participates in the local [employer] choir and occasionally is asked to give a sermon and appears to enjoy [employer] participation. Patient is an artist. Stated virtual communication is difficult, prefers in person communication versus a computer.     Are you currently in a significant relationship? No    Do you have any children? No    What is your current living situation? [place] Has a housemate who helps with chores such as  chopping wood and lawn care. Housemate has been sober for 15 years.     Are you employed/attending school? Retired in 2018.     SUMMARY:  Ability to understand written treatment materials: Yes  Ability to understand patient rules and patient rights: Yes  Does the patient recognize needs related to substance use and is willing to follow treatment recommendations: Yes  Does the patient have an opioid use disorder:  No    ASAM Dimension Scale Ratings:    Dimension 1 -  Acute Intoxication/Withdrawal: 0 - No Problem  Does not display symptoms of withdrawal and is not intoxicated as of the time of the assessment interview. Last alcohol use approximately 10 days ago.   Dimension 2 - Biomedical: 1 - Minor Problem  Has access to primary care and seeks medical treatment for problems. Has chronic obstructive pulmonary disease and some instability when walking needing accomodation.  Dimension 3 - Emotional/Behavioral/Cognitive Conditions: 2 - Moderate Problem  Depression and anxiety with distress when thinking about past/future. Mental health also appears to be negatively exacerbated by alcohol. Is able to participate in most treatment activities.   Dimension 4 - Readiness to Change:  2 - Moderate Problem  Verbally compliant and appears to be in the contemplation stage of change evaluating options. Appears to recognize loneliness as an issue but has not established a plan to address and appears somewhat passive in seeking solutions.   Dimension 5 - Relapse/Continued Use/ Continued Problem Potential: 4 - Extreme Problem  Patient does not have coping skills to address mental health and substance use and is highly vulnerable to both further substance use or further mental health problems. Patient has had multiple relapses over the past year. Patient has no apparent plan to prevent recidivism and use has been escalating since having heart surgery during the summer.   Dimension 6 - Recovery Environment:  3 - Severe Problem  Single,  retired, and lives in own home. Has a housemate who has 15 years of sobriety. Structured meaningful activity is limited to a weekly lunch with friends and [employer] attendance. Immediate family is . Has contact with 1 niece. Patient appears to be knowledgeable about his community including resources. Patient has transportation.    Category of Substance Severity (ICD-10 Code / DSM 5 Code)     Alcohol Use Disorder Severe  (10.20) (303.90)   Cannabis Use Disorder The patient does not meet the criteria for a Cannabis use disorder.   Hallucinogen Use Disorder The patient does not meet the criteria for a Hallucinogen use disorder.   Inhalant Use Disorder The patient does not meet the criteria for an Inhalant use disorder.   Opioid Use Disorder The patient does not meet the criteria for an Opioid use disorder.   Sedative, Hypnotic, or Anxiolytic Use Disorder The patient does not meet the criteria for a Sedative/Hypnotic use disorder.   Stimulant Related Disorder The patient does not meet the criteria for a Stimulant use disorder.   Tobacco Use Disorder The patient does not currently meet the criteria for a Tobacco use disorder, but has a history of use.   Other (or unknown) Substance Use Disorder The patient does not meet the criteria for a Other (or unknown) Substance use disorder.     Collateral information: Sufficient information is obtained from the patient, chart, treatment team to support diagnosis and recommendations. Contact with additional collateral is not required.    Recommendations:   1)  Complete a residential chemical dependency treatment program and follow aftercare recommendations.   2)  Abstain from alcohol and all mood-altering chemicals unless prescribed by a licensed medical provider.   3)  Attend at least 2 weekly support group meetings, such as AA/NA, Celebrate Recovery, etc.   4)  Obtain a male mentor/sponsor and work with mentor/sponsor at least 1 time per week.   5)  Continue to attend all of  your outpatient medication management appointments.   6)  Continue to attend all of your outpatient mental health therapy appointments.   7)  Follow all recommendations of your treatment/medical providers.  8)  Seek additional sober socialization and/or volunteer activities.     Clinical Substantiation:  Patient has been unable to maintain abstinence from alcohol while living at his current home environment, would benefit from developing sober coping skills, would benefit from developing a sober peer support network, and has mental health symptoms which are exacerbated by substance abuse. Patient has had ED admissions due to alcohol use in 2024. Patient would benefit by developing further understanding about the disease of addiction and developing an effective relapse prevention plan to address loneliness.     Patient meets the criteria for Alcohol Use Disorder (Severe) as manifested by the following diagnostic criteria:    1) The substance is often taken in larger amounts or over a longer period than was intended.   2) There is a persistent desire or unsuccessful efforts to cut down or control use of the substance.   3) A great deal of time is spent in activities necessary to obtain the substance, use the substance, or recover from its effects.   4) Craving, or a strong desire or urge to use the substance.   8) Recurrent use of the substance in situations in which it is physically hazardous.   9) Use of the substance is continued despite knowledge of having a persistent or recurrent physical or psychological problem that is likely to have been caused or exacerbated by the substance.   10) Tolerance, as defined by a need for markedly increased amounts of the substance to achieve intoxication or desired effect.  11) Withdrawal, as manifested by the characteristic withdrawal syndrome for the substance.    CAROL consult completed by: MAURICIO Plaza.  Phone Number: [phone]  E-mail Address:  [email or web address]  [address] [place] [id number]     *Due to regulation of Title 42 of the Code of Federal Regulations (CFR) Part 2: Confidentiality laws apply to this note and the information wherein.  Thus, this note cannot be copy and pasted into any other health care staff's note nor can it be included in general medical records sent to ANY outside agency without the patient's written consent.

## 2024-10-29 NOTE — PLAN OF CARE
Problem: Adult Behavioral Health Plan of Care  Goal: Patient-Specific Goal (Individualization)  Description: Patient will sleep 6 to 8 hours per night  Patient will eat at least 50% of meals  Patient will attend at least 50% of groups  Patient will comply with recommendations of treatment team  Patient will remain medication compliant      Outcome: Progressing   Patient slept through the night with regular respirations and position changes noted.  Face to face end of shift report communicated to day shift RN.     Sabina Antonio RN  10/29/2024  5:49 AM

## 2024-10-29 NOTE — CONSULTS
Inpatient CD Consult. LADC to assist with referral(s).     Recommendations:   1)  Complete a residential chemical dependency treatment program and follow aftercare recommendations.   2)  Abstain from alcohol and all mood-altering chemicals unless prescribed by a licensed medical provider.   3)  Attend at least 2 weekly support group meetings, such as AA/NA, [facility], etc.   4)  Obtain a male mentor/sponsor and work with mentor/sponsor at least 1 time per week.   5)  Continue to attend all of your outpatient medication management appointments.   6)  Continue to attend all of your outpatient mental health therapy appointments.   7)  Follow all recommendations of your treatment/medical providers.  8)  Seek additional sober socialization and/or volunteer activities.     Category of Substance Severity (ICD-10 Code / DSM 5 Code)      Alcohol Use Disorder Severe  (10.20) (303.90)   Tobacco Use Disorder The patient does not currently meet the criteria for a Tobacco use disorder, but has a history of use.      Collateral information: Sufficient information is obtained from the patient, chart, treatment team to support diagnosis and recommendations. Contact with additional collateral is not required.     Clinical Substantiation:  Patient has been unable to maintain abstinence from alcohol while living at his current home environment, would benefit from developing sober coping skills, would benefit from developing a sober peer support network, and has mental health symptoms which are exacerbated by substance abuse. Patient has had ED admissions due to alcohol use in 2024. Patient would benefit by developing further understanding about the disease of addiction and developing an effective relapse prevention plan to address loneliness.      Patient meets the criteria for Alcohol Use Disorder (Severe) as manifested by the following diagnostic criteria:     1) The substance is often taken in larger amounts or over a  longer period than was intended.   2) There is a persistent desire or unsuccessful efforts to cut down or control use of the substance.   3) A great deal of time is spent in activities necessary to obtain the substance, use the substance, or recover from its effects.   4) Craving, or a strong desire or urge to use the substance.   8) Recurrent use of the substance in situations in which it is physically hazardous.   9) Use of the substance is continued despite knowledge of having a persistent or recurrent physical or psychological problem that is likely to have been caused or exacerbated by the substance.   10) Tolerance, as defined by a need for markedly increased amounts of the substance to achieve intoxication or desired effect.  11) Withdrawal, as manifested by the characteristic withdrawal syndrome for the substance.     CAROL consult completed by: Rony Logan [facility].  Phone Number: [phone]  E-mail Address: [email or web address]     *Due to regulation of Title 42 of the Code of Federal Regulations (CFR) Part 2: Confidentiality laws apply to this note and the information wherein.  Thus, this note cannot be copy and pasted into any other health care staff's note nor can it be included in general medical records sent to ANY outside agency without the patient's written consent.DAANES ID: [id number]

## 2024-10-30 ENCOUNTER — APPOINTMENT (OUTPATIENT)
Dept: OCCUPATIONAL THERAPY | Facility: HOSPITAL | Age: 74
DRG: 885 | End: 2024-10-30
Payer: COMMERCIAL

## 2024-10-30 PROCEDURE — 250N000013 HC RX MED GY IP 250 OP 250 PS 637: Performed by: NURSE PRACTITIONER

## 2024-10-30 PROCEDURE — 99232 SBSQ HOSP IP/OBS MODERATE 35: CPT | Performed by: NURSE PRACTITIONER

## 2024-10-30 PROCEDURE — 124N000001 HC R&B MH

## 2024-10-30 PROCEDURE — 250N000013 HC RX MED GY IP 250 OP 250 PS 637

## 2024-10-30 PROCEDURE — 97165 OT EVAL LOW COMPLEX 30 MIN: CPT | Mod: GO

## 2024-10-30 RX ADMIN — FUROSEMIDE 40 MG: 20 TABLET ORAL at 09:01

## 2024-10-30 RX ADMIN — DULOXETINE HYDROCHLORIDE 60 MG: 30 CAPSULE, DELAYED RELEASE PELLETS ORAL at 09:01

## 2024-10-30 RX ADMIN — ASPIRIN 81 MG: 81 TABLET, COATED ORAL at 09:00

## 2024-10-30 RX ADMIN — TOLTERODINE 4 MG: 4 CAPSULE, EXTENDED RELEASE ORAL at 09:00

## 2024-10-30 RX ADMIN — METOPROLOL SUCCINATE 25 MG: 25 TABLET, EXTENDED RELEASE ORAL at 08:59

## 2024-10-30 RX ADMIN — LISINOPRIL 20 MG: 20 TABLET ORAL at 08:59

## 2024-10-30 RX ADMIN — Medication 100 MG: at 09:00

## 2024-10-30 RX ADMIN — EMPAGLIFLOZIN 10 MG: 10 TABLET, FILM COATED ORAL at 08:59

## 2024-10-30 RX ADMIN — DOCUSATE SODIUM 100 MG: 100 CAPSULE, LIQUID FILLED ORAL at 08:59

## 2024-10-30 RX ADMIN — OLANZAPINE 2.5 MG: 2.5 TABLET, FILM COATED ORAL at 11:13

## 2024-10-30 RX ADMIN — QUETIAPINE FUMARATE 25 MG: 25 TABLET ORAL at 20:55

## 2024-10-30 RX ADMIN — Medication 1 TABLET: at 09:00

## 2024-10-30 RX ADMIN — LURASIDONE HYDROCHLORIDE 20 MG: 20 TABLET, COATED ORAL at 17:14

## 2024-10-30 RX ADMIN — GABAPENTIN 400 MG: 400 CAPSULE ORAL at 14:31

## 2024-10-30 RX ADMIN — GABAPENTIN 400 MG: 400 CAPSULE ORAL at 20:55

## 2024-10-30 RX ADMIN — GABAPENTIN 400 MG: 400 CAPSULE ORAL at 09:00

## 2024-10-30 RX ADMIN — FLUTICASONE FUROATE AND VILANTEROL TRIFENATATE 1 PUFF: 100; 25 POWDER RESPIRATORY (INHALATION) at 09:01

## 2024-10-30 RX ADMIN — ROSUVASTATIN CALCIUM 20 MG: 10 TABLET, FILM COATED ORAL at 20:55

## 2024-10-30 ASSESSMENT — ACTIVITIES OF DAILY LIVING (ADL)
ADLS_ACUITY_SCORE: 0
ORAL_HYGIENE: INDEPENDENT
ADLS_ACUITY_SCORE: 0
HYGIENE/GROOMING: INDEPENDENT
ADLS_ACUITY_SCORE: 0
HYGIENE/GROOMING: INDEPENDENT
ADLS_ACUITY_SCORE: 0
DRESS: INDEPENDENT;SCRUBS (BEHAVIORAL HEALTH)
ADLS_ACUITY_SCORE: 0
DRESS: SCRUBS (BEHAVIORAL HEALTH);INDEPENDENT
LAUNDRY: UNABLE TO COMPLETE
ORAL_HYGIENE: INDEPENDENT
LAUNDRY: UNABLE TO COMPLETE
ADLS_ACUITY_SCORE: 0

## 2024-10-30 NOTE — PROGRESS NOTES
"[facility]    Medical Services Progress Note    Date of Service (when I saw the patient): 10/30/2024    Assessment & Plan     Principal Problem:    Depression with anxiety     Active Medical Problems:    Essential hypertension- vitals stable. Denies chest pain. Reports chronic SOB, hx of COPD. Not being using his maintenance inhaler.  Home dose of lisinopril  and metoprolol ordered.   10/29- denies chest pain,  denies feeling sob. Vitals stable.   10/30- denies chest pain, worsening sob. Reports a little sob at times due to anxiety. Vitals stable        COPD (chronic obstructive pulmonary disease) (H)-denies chest pain. Reports chronic SOB. He reports yesterday he was very SOB but states he feels much better this morning. States that especially after using his Breo inhaler today he feels much better. Albuterol inhaler order as needed. Lungs are clear bilaterally. Nursing to continue to monitor.    10/29- Denies chest pain, sob. Using maintenance inhaler daily. Has only utilized albuterol as needed one time on the 25th. Lungs clear bilaterally.   10/30 - denies worsening sob. Reports when he is anxious he feels \"a little short of breath\".  Has not required albuterol inhaler. Nursing to continue to monitor.       Esophageal reflux- denies GERD symptoms. Takes omeprazole as needed. Formulary substitute protonix ordered.   10/29- denies GERD symptoms.   10/30- Denies GERD symptoms.        Hyperlipidemia- home dose of crestor ordered.        Obstructive sleep apnea- repots he is suppose to use a cpap and stopped using it a few months ago. He may use a bed wedge.        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 Denies chest pain, reports chronic sob. Home dose of aspirin, metoprolol, lisinopril, crestor, jardiance, nitroglycerin ordered.      Chronic lower extremity edema- 1-2 + bilateral lower extremity edema, hx of heart failure, CABG x 4. He uses ANGY hose and requests to use his home ones because they are more " "comfortable and fit him well. Nursing did wash these and order placed for him to use from 7am-7pm. Nursing staff will assist with applying and removing. Home dose of lasix ordered.   10-29- Wearing DARWIN hose. He reports he has had them in place for 24 hours. Bilateral lower extremity trace edema noted. Nursing to remove Darwin hose after 12 hours and leave off 12 hours.   10/30- no worsening edema noted.     Diastolic CHF- its noted this diagnosis could be in part related to recent vein harvest/surgery. Only trace bilateral lower extremity edema noted. Pt states \"I don't know if it's as much short of breath as much as being anxious\". \"People tell me I am short of breath and I don't feel short of breath\". Lungs clear bilaterally. BNP 39 which is not indicate a CHF exacerbation. CBC wnl, CMP shows a mild increase in creatinine and bun. Creatinine 1.12 on admission and  today 1.18. Pt encouraged to increase water  intake. Will recheck renal function in a couple days. Chest xray shows The cardiac silhouette is normal in size. There is irregular distribution of pulmonary vascularity suspicious for emphysema. There  is some linear opacities in both lungs most likely representing scarring. Postoperative changes are seen in the mediastinum. No pleural effusion or pneumothorax. IMPRESSION: Linear atelectasis or scarring in both lungs.Possible emphysema.O2 saturation is stable at 96% on room air. Bp is stable. Will add 2 gram Na restriction, daily weights. Continue with inhalers. Nursing to continue to monitor and consult for new or worsening symptoms.   10/30- stable. No worsening sob. Reports he feels sob when anxious. Denies chest pain, difficulty breathing. Nursing to continue to monitor and consult for new or worsening symptoms.     Pt medically stable, no acute medical concerns. Chronic medical problems stable. Will sign off. Please consult for any new medical issues or concerns.                                              "                          Code Status: Full Code.    Fatmata Wilcox CNP        -Data reviewed today: I reviewed all new labs and imaging results over the last 24 hours.     Physical Exam   Temp: 97.7  F (36.5  C) Temp src: Temporal BP: 109/61 Pulse: 69   Resp: 16 SpO2: 95 % O2 Device: None (Room air)    Vitals:    10/26/24 0800 10/29/24 1706 10/30/24 0831   Weight: 107.8 kg (237 lb 9.6 oz) 111.5 kg (245 lb 14.4 oz) 109.4 kg (241 lb 1.6 oz)     Vital Signs with Ranges  Temp:  [96.1  F (35.6  C)-97.7  F (36.5  C)] 97.7  F (36.5  C)  Pulse:  [60-76] 69  Resp:  [16-20] 16  BP: (109-131)/(60-73) 109/61  SpO2:  [95 %-97 %] 95 %  No intake/output data recorded.    Constitutional: awake, alert, cooperative, no apparent distress, and appears stated age, vitals stable  Respiratory: No increased work of breathing, good air exchange, clear to auscultation bilaterally, no crackles or wheezing  Cardiovascular: Normal apical impulse, regular rate and rhythm, normal S1 and S2, no S3 or S4, and no murmur noted  Skin: trace edema to bilateral lower extremities otherwise, no redness, warmth, and no rashes  Neuropsychiatric: General: anxious, tearful, calm, and normal eye contact    Medications   Current Facility-Administered Medications   Medication Dose Route Frequency Provider Last Rate Last Admin    aspirin EC tablet 81 mg  81 mg Oral QAM Fatmata Wilcox CNP   81 mg at 10/30/24 0900    docusate sodium (COLACE) capsule 100 mg  100 mg Oral Daily Fatmata Wilcox CNP   100 mg at 10/30/24 0859    DULoxetine (CYMBALTA) DR capsule 60 mg  60 mg Oral QAM Brook Rodriguez APRN CNP   60 mg at 10/30/24 0901    empagliflozin (JARDIANCE) tablet 10 mg  10 mg Oral QAM Fatmata Wilcox CNP   10 mg at 10/30/24 0859    fluticasone-vilanterol (BREO ELLIPTA) 100-25 MCG/ACT inhaler 1 puff  1 puff Inhalation Fatmata Velasquez CNP   1 puff at 10/30/24 0901    furosemide (LASIX) tablet 40 mg  40 mg Oral Fatmata Velasquez CNP   40 mg at 10/30/24 0901     gabapentin (NEURONTIN) capsule 400 mg  400 mg Oral TID Gunjan Lucas NP   400 mg at 10/30/24 0900    lisinopril (ZESTRIL) tablet 20 mg  20 mg Oral QAM RedKilo maddena, CNP   20 mg at 10/30/24 0859    lurasidone (LATUDA) tablet 20 mg  20 mg Oral Daily with Brook Jiménez APRN CNP   20 mg at 10/29/24 1659    metoprolol succinate ER (TOPROL XL) 24 hr tablet 25 mg  25 mg Oral QAM Redmaryanne, Fatmata, CNP   25 mg at 10/30/24 0859    multivitamin w/minerals (THERA-VIT-M) tablet 1 tablet  1 tablet Oral QAM RedKilo maddena, CNP   1 tablet at 10/30/24 0900    QUEtiapine (SEROquel) tablet 25 mg  25 mg Oral At Bedtime Kalani Teran APRN CNP   25 mg at 10/29/24 2010    rosuvastatin (CRESTOR) tablet 20 mg  20 mg Oral At Bedtime Kilo Wilcoxa, CNP   20 mg at 10/29/24 2010    thiamine tablet 100 mg  100 mg Oral QAM Redmaryanne, Fatmata, CNP   100 mg at 10/30/24 0900    tolterodine ER (DETROL LA) 24 hr capsule 4 mg  4 mg Oral QAM RedCindi maddenesa, CNP   4 mg at 10/30/24 0900       Data   Recent Labs   Lab 10/29/24  1028 10/24/24  1421   WBC 6.7 5.8   HGB 15.1 14.5   MCV 91 90    233   INR  --  1.02    136   POTASSIUM 4.0 4.0   CHLORIDE 104 101   CO2 24 21*   BUN 26.5* 18.4   CR 1.18* 1.12   ANIONGAP 13 14   SIDDHARTH 9.6 9.3   * 111*   ALBUMIN 4.2 4.3   PROTTOTAL 6.7 6.8   BILITOTAL 0.3 0.5   ALKPHOS 71 72   ALT 45 72*   AST 26 38   LIPASE  --  25       No results found for this or any previous visit (from the past 24 hours).

## 2024-10-30 NOTE — PROGRESS NOTES
[facility] PSYCHIATRY  PROGRESS NOTE     SUBJECTIVE     Prior to interviewing the patient, I met with nursing and reviewed patient's clinical condition. We discussed clinical care both before and after the interview. I have reviewed the patient's clinical course by review of records including previous notes, labs, and vital signs.     Per nursing, the patient had the following behavioral events over the last 24-hours: none.    On psychiatric interview, patient is seen in the [facility] area. Was struggling with significant anxiety and depression this morning. Thinking about how alcohol impacted his life and that he has nothing to do or live for was too much for him to deal with. Was tearful and just wanted to go back to bed. Was very upset talking with LADC in group room today, was observed by staff and treated with olanzapine 2.5 mg which was helpful.     Reports he is very depressed and hopeless. Is fearful of returning home and resuming drinking due to the emotions he's experiencing.   He is willing to return to residential treatment. Offered to start on naltrexone. He reports that he's tried it in the past and it wasn't helpful. He also reports that he's already on so many pills it's hard for him to keep them organized and takes hours to fill his pill container. Will request resources for him at discharge for home health aid.         MEDICATIONS   Scheduled Meds:  Current Facility-Administered Medications   Medication Dose Route Frequency Provider Last Rate Last Admin    aspirin EC tablet 81 mg  81 mg Oral QAFatmata Luis CNP   81 mg at 10/29/24 0853    docusate sodium (COLACE) capsule 100 mg  100 mg Oral Daily Fatmata Wilcox CNP   100 mg at 10/29/24 0853    DULoxetine (CYMBALTA) DR capsule 60 mg  60 mg Oral QAM Brook Rodriguez, APRN SERA        empagliflozin (JARDIANCE) tablet 10 mg  10 mg Oral QAM Fatmata Wilcox CNP   10 mg at 10/29/24 0853    fluticasone-vilanterol (BREO ELLIPTA) 100-25 MCG/ACT  inhaler 1 puff  1 puff Inhalation QAM Fatmata Wlicox CNP   1 puff at 10/29/24 0853    furosemide (LASIX) tablet 40 mg  40 mg Oral QAM Fatmata Wilcox CNP   40 mg at 10/29/24 0853    gabapentin (NEURONTIN) capsule 400 mg  400 mg Oral TID Gunjan Lucas NP   400 mg at 10/29/24 2010    lisinopril (ZESTRIL) tablet 20 mg  20 mg Oral QAM Fatmata Wilcox CNP   20 mg at 10/29/24 0853    lurasidone (LATUDA) tablet 20 mg  20 mg Oral Daily with supper Brook Rodriguez APRN CNP   20 mg at 10/29/24 1659    metoprolol succinate ER (TOPROL XL) 24 hr tablet 25 mg  25 mg Oral QAM Fatmata Wilcox CNP   25 mg at 10/29/24 0853    multivitamin w/minerals (THERA-VIT-M) tablet 1 tablet  1 tablet Oral QAM Fatmata Wilcox CNP   1 tablet at 10/29/24 0853    QUEtiapine (SEROquel) tablet 25 mg  25 mg Oral At Bedtime Kalani Teran APRN CNP   25 mg at 10/29/24 2010    rosuvastatin (CRESTOR) tablet 20 mg  20 mg Oral At Bedtime Fatmata Wilcox CNP   20 mg at 10/29/24 2010    thiamine tablet 100 mg  100 mg Oral QAM Fatmata Wilcox CNP   100 mg at 10/29/24 0853    tolterodine ER (DETROL LA) 24 hr capsule 4 mg  4 mg Oral QAM Fatmata Wilcox CNP   4 mg at 10/29/24 0853     PRN Meds:.  Current Facility-Administered Medications   Medication Dose Route Frequency Provider Last Rate Last Admin    acetaminophen (TYLENOL) tablet 650 mg  650 mg Oral Q4H PRN Isaiah Elizabeth APRN CNP        albuterol (PROVENTIL HFA/VENTOLIN HFA) inhaler  2 puff Inhalation Q4H PRN Fatmata Wilcox CNP   2 puff at 10/25/24 1242    alum & mag hydroxide-simethicone (MAALOX) suspension 30 mL  30 mL Oral Q4H PRN Isaiah Elizabeth APRN CNP        calcium carbonate (TUMS) chewable tablet 500 mg  500 mg Oral Daily PRN Kalani Teran APRN CNP        hydrOXYzine HCl (ATARAX) tablet 25 mg  25 mg Oral 4x Daily PRN Gunjan Lucas NP   25 mg at 10/25/24 2131    melatonin tablet 3 mg  3 mg Oral At Bedtime PRN Isaiah Elizabeth APRN CNP   3 mg at 10/25/24 2131    nicotine (NICORETTE) gum 2 mg   2 mg Buccal Q1H PRN Isaiah Elizabeth APRN CNP        nitroGLYcerin (NITROSTAT) sublingual tablet 0.4 mg  0.4 mg Sublingual Q5 Min PRN Fatmata Wilcox CNP        OLANZapine (zyPREXA) tablet 2.5 mg  2.5 mg Oral TID PRN Isaiah Elizabeth APRN CNP        Or    OLANZapine (zyPREXA) injection 2.5 mg  2.5 mg Intramuscular TID PRN Isaiah Elizabeth APRN CNP        pantoprazole (PROTONIX) EC tablet 40 mg  40 mg Oral Daily PRN Fatmata Wilcox CNP            ALLERGIES   No Known Allergies     MENTAL STATUS EXAM   Vitals: /73   Pulse 76   Temp 97.7  F (36.5  C) (Temporal)   Resp 16   Wt 109.4 kg (241 lb 1.6 oz)   SpO2 97%   BMI 32.70 kg/m      Appearance:  alert, has showered and changed his clothes today.   Attitude:  cooperative  Eye Contact:  good  Mood:  depressed  Affect:  mood congruent  Speech:  clear, coherent and slight SOB noted with speaking  Psychomotor Behavior:  no evidence of tardive dyskinesia, dystonia, or tics  Thought Process:  logical and linear  Associations:  no loose associations  Thought Content:  no evidence of psychotic thought and passive suicidal ideation present - no plan or intent, hopelessness remains evident  Insight:  limited  Judgment:  limited  Oriented to:  time, person, and place  Attention Span and Concentration:  fair  Recent and Remote Memory:  limited/fair  Fund of Knowledge: appropriate  Muscle Strength and Tone: normal  Gait and Station: Normal         LABS   Recent Results (from the past 24 hours)   Comprehensive metabolic panel    Collection Time: 10/29/24 10:28 AM   Result Value Ref Range    Sodium 141 135 - 145 mmol/L    Potassium 4.0 3.4 - 5.3 mmol/L    Carbon Dioxide (CO2) 24 22 - 29 mmol/L    Anion Gap 13 7 - 15 mmol/L    Urea Nitrogen 26.5 (H) 8.0 - 23.0 mg/dL    Creatinine 1.18 (H) 0.67 - 1.17 mg/dL    GFR Estimate 65 >60 mL/min/1.73m2    Calcium 9.6 8.8 - 10.4 mg/dL    Chloride 104 98 - 107 mmol/L    Glucose 110 (H) 70 - 99 mg/dL    Alkaline Phosphatase 71 40 - 150 U/L     "AST 26 0 - 45 U/L    ALT 45 0 - 70 U/L    Protein Total 6.7 6.4 - 8.3 g/dL    Albumin 4.2 3.5 - 5.2 g/dL    Bilirubin Total 0.3 <=1.2 mg/dL   Nt probnp inpatient    Collection Time: 10/29/24 10:28 AM   Result Value Ref Range    N terminal Pro BNP Inpatient 39 0 - 900 pg/mL   CBC with platelets and differential    Collection Time: 10/29/24 10:28 AM   Result Value Ref Range    WBC Count 6.7 4.0 - 11.0 10e3/uL    RBC Count 4.94 4.40 - 5.90 10e6/uL    Hemoglobin 15.1 13.3 - 17.7 g/dL    Hematocrit 44.8 40.0 - 53.0 %    MCV 91 78 - 100 fL    MCH 30.6 26.5 - 33.0 pg    MCHC 33.7 31.5 - 36.5 g/dL    RDW 13.7 10.0 - 15.0 %    Platelet Count 251 150 - 450 10e3/uL    % Neutrophils 73 %    % Lymphocytes 14 %    % Monocytes 10 %    % Eosinophils 2 %    % Basophils 1 %    % Immature Granulocytes 0 %    NRBCs per 100 WBC 0 <1 /100    Absolute Neutrophils 4.9 1.6 - 8.3 10e3/uL    Absolute Lymphocytes 0.9 0.8 - 5.3 10e3/uL    Absolute Monocytes 0.7 0.0 - 1.3 10e3/uL    Absolute Eosinophils 0.1 0.0 - 0.7 10e3/uL    Absolute Basophils 0.1 0.0 - 0.2 10e3/uL    Absolute Immature Granulocytes 0.0 <=0.4 10e3/uL    Absolute NRBCs 0.0 10e3/uL   Extra Red Top Tube    Collection Time: 10/29/24 10:28 AM   Result Value Ref Range    Hold Specimen JIC          IMPRESSION   This is a 74 year old male with a PMH of MDD, CHELSIE, and alcohol abuse who presents to the ED for evaluation of anxiety and depression. Patient reported taking three tablets of his Ativan this morning in an attempt to help his anxiety, though started to feel tired and weak. Tried to get into [facility] though they were full. Today patient denies any SI. He reports feeling more depressed and anxiety over past few weeks, more so the past few days. He notes that he feels very isolated and lonely since retiring from work as a . He has no family or children. He reports feeling he has \"no purpose\". Patient notes that he is interested in increasing his mental health services and " looking into volunteer options. He has longer term plan of getting into an assisted living. Is in agreement to continue Cymbalta, Melatonin, Gabapentin. He does not want to continue Ativan and is aware of the risks in taking this medication given his age and his ongoing alcohol use. He was not taking Naltrexone at home, though would be interested in restarting this prior to discharge. Patient is in agreement to voluntary admission for stabilization. He notes that he may be interested in stepping down from the hospital to [facility] prior to return home, can look into this closer to discharge.     Today: Tolerating medication changes without SE. Continues with passive SI. CD assessment recommends residential treatment, referral pending. High likelihood he will relapse if discharged home. No change in medications today. Spiritual consult pending with .      DIAGNOSES     #.Major depressive disorder, recurrent, severe  #. Alcohol use disorder, severe  #. Generalized anxiety disorder       PLAN     Location: Unit 5  Legal Status: Orders Placed This Encounter      Voluntary    Safety Assessment:    Behavioral Orders   Procedures    Code 1 - Restrict to Unit    Routine Programming     As clinically indicated    Status 15     Every 15 minutes.      PTA psychotropic medications stopped:     -    PTA psychotropic medications continued/changed:     - duloxetine 90 mg qAM -> reduced to 60 mg 10/29    New medications tried and stopped:   Valium 5 mg BID X 2 doses.    New medications initiated:   Seroquel 25 mg at hs  Latuda 20 mg with supper -10/29    Today's Changes:   - None    Programming: Patient will be treated in a therapeutic milieu with appropriate individual and group therapies. Education will be provided on diagnoses, medications, and treatments.     Medical diagnoses:  Per medicine    Consult: CD evaluation, OT evaluation - cognitive  Tests: None    Anticipated LOS: 5-7 days  Disposition: home with services.        TREATMENT TEAM CARE PLAN     Progress: Continued symptoms.    Continued Stay Criteria/Rationale: Continued symptoms without sufficient improvement/resolution.    Medical/Physical: See above.    Precautions: See above.     Plan: Continue inpatient care with unit support and medication management.    Rationale for change in precautions or plan: NA due to no change.    Participants: YOSSI Callejas, DEAN-BC, FNP-C    The patient's care was discussed with the treatment team and chart notes were reviewed.       ATTESTATION      YOSSI Callejas, DENA-BC, FNP-C     -7

## 2024-10-30 NOTE — PLAN OF CARE
Face to face end of shift report received from Myla VAUGHAN RN. Rounding completed. Patient observed in bed, awake.     Pt has been cooperative this shift. He is quite anxious this shift and expresses more depression. Pt has been attending groups throughout the day and has been discussing his etoh use. Pt reports passive SI. Denied HI, AH, VH. Denied pain. Pt stats the Hydroxyzine only helps for a short while. Administered Zyprexa 2.5 mg. Declined PRN inhaler despite reporting feeling SOB. Pt reported that the Zyprexa worked well. Pt able to make his needs known. Steady gait- no falls. Frequent rounding.    Problem: Adult Behavioral Health Plan of Care  Goal: Patient-Specific Goal (Individualization)  Description: Patient will sleep 6 to 8 hours per night  Patient will eat at least 50% of meals  Patient will attend at least 50% of groups  Patient will comply with recommendations of treatment team  Patient will remain medication compliant      Outcome: Progressing     Problem: Depression  Goal: Decreased Depression Symptoms  Description: Patient will verbalize a decrease in depression, and increase in mood by discharge.  Outcome: Progressing     Problem: Suicide Risk  Goal: Absence of Self-Harm  Description: Patient will be free of suicidal ideation or intent by discharge.  Outcome: Progressing       Maxine Miramontes RN  10/30/2024  9:13 AM

## 2024-10-30 NOTE — PROGRESS NOTES
10/30/24 0900   Appointment Info   Signing Clinician's Name / Credentials (OT) Crys Mendez, OTR/L   Cognitive Screens/Assessments   Cognitive Assessments Completed Other Cognitive Screen/Assessment   Cognitive Assessment Test MOCA   Cognitive Assessment Test Score 25/30   Cognitive Assessment Test Interpretation Score of 26 or above indicates normal cognition.  See eval for further infor on pt score   OT Total Evaluation Time   OT Eval, Low Complexity Minutes (03800) 21   OT Goals   Therapy Frequency (OT) 3 times/week   OT Predicted Duration/Target Date for Goal Attainment 11/13/24   OT Goals Cognition;OT Goal 1   OT: Cognitive Patient/caregiver will verbalize understanding of cognitive assessment results/recommendations as needed for safe discharge planning   OT: Goal 1 Pt will verbalize understanding of 1-2 new healthy coping skills to aide in emotion regulation.   OT Discharge Planning   OT Plan Conitnue for cog and coping skills   OT Discharge Recommendation (DC Rec) other (see comments)  (TBD in collaboration with treatment team)   OT Rationale for DC Rec Clincal judgement   OT Brief overview of current status MOCA completed.  Pt would likely benefit from home med nurse, cleaning services and regularly scheduled activities in the community for social engagement.   Total Session Time   Total Session Time (sum of timed and untimed services) 21

## 2024-10-30 NOTE — PROGRESS NOTES
"Behavioral Health Occupational Therapy Eval      Name: Jeffrey King MRN# [id number]   Age: 74 year old YOB: 1950     Date of Consultation: 2024  Primary care provider: Karla Ref-Primary, Physician    Referring Physician: Malathi Rodriguez  Orders: Eval and Treat  Medical Diagnosis: MDD, CHELSIE, alcohol use disorder  Onset of Illness/Injury: 10/29/24    Prior Level of Function: Pt was admitted due to increase in anxiety and depression.   ETOH abuse and recent open heart surgery.  Pt lives in his home in [place].  Is a retired  and has a \"housemate\".  Housemate is sober and assists with heavy lifting, yardwork and some basic meals.  Hires a  for house cleaning.  Independent with ADLs.  Manages his own meds but states that it does not go well \"there are too many\".  Pt reports limited social support as he has no family and he is retired and does not have a purpose.  Enjoys painting and art, sings in the [employer] and gets together one day a week with friends for lunch.      Current Level of Function: Pt is met in his room. States that he has been sleeping well but \"I wake up still really tired\".  Agrees to meet with OT for cog testing.  MOCA version 7.1 completed and pt scored 25/30 where score of 26 or above is considered normal cognition.  Pt reports having a similar cognitive test within the last 6 months.  Unsure of the test he was given, where he took it and what his score was at that time.  Does state he has no major concerns with is brain function, does forget things at times but feels this is due to \"old age\".      MOCA 7.1  Visuospatial/ Executive Functionin/5  Trail making (alternating letters and numbers):   Shape copy:   Clock drawing: 3/3  Naming: 3/3  Immediate Recall (not scored): /  Patient accurately verbalized 5/5 non-related words  Attention: /6   Number repetition:   Number reversal:   Sustained attention: Patient identifying 10/11 targets with x5 " false positives  Serial subtraction: Patient completed serial 7 subtractions  calculations  Language: 2/3  Sentence repetition: 2/2 when asked to repeat a sentence word-for-word  Divergent naming: Patient named 5 items beginning with the letter /f/ within one minute  Abstract Thinkin/2  Similarities between 2 items (abstract thinking): 1/2  Delayed Recall:   Patient recalled 5/5 words after approx 5 minute delay without cues.  Orientation:   Patient oriented to month, year, day of the week, place and city, but was not oriented to date.    TOTAL SCORE: 25/30      Patient/Family Goal: I need to be more social and around people    Fall Screen:   Have you fallen 2 or more times in the last year? No  Have you fallen and had an injury in the last year? No  Timed up & go: NA  Is patient a fall risk? Yes    Past Medical History:   History reviewed. No pertinent past medical history.    Past Surgical History:  History reviewed. No pertinent surgical history.    Medications:   Current Facility-Administered Medications   Medication Dose Route Frequency Provider Last Rate Last Admin    acetaminophen (TYLENOL) tablet 650 mg  650 mg Oral Q4H PRN Isaiah Elizabeth APRN CNP        albuterol (PROVENTIL HFA/VENTOLIN HFA) inhaler  2 puff Inhalation Q4H PRN Fatmata Wilcox CNP   2 puff at 10/25/24 1242    alum & mag hydroxide-simethicone (MAALOX) suspension 30 mL  30 mL Oral Q4H PRN Isaiah Elizabeth APRN CNP        aspirin EC tablet 81 mg  81 mg Oral QAM Fatmata Wilcox CNP   81 mg at 10/29/24 0853    calcium carbonate (TUMS) chewable tablet 500 mg  500 mg Oral Daily PRN Kalani Teran APRN CNP        docusate sodium (COLACE) capsule 100 mg  100 mg Oral Daily Fatmata Wilcox CNP   100 mg at 10/29/24 0853    DULoxetine (CYMBALTA) DR capsule 60 mg  60 mg Oral QAM Brook Rodriguez APRN CNP        empagliflozin (JARDIANCE) tablet 10 mg  10 mg Oral QAM Fatmata Wilcox CNP   10 mg at 10/29/24 0853    fluticasone-vilanterol (BREO  ELLIPTA) 100-25 MCG/ACT inhaler 1 puff  1 puff Inhalation QAM Fatmata Wilcox CNP   1 puff at 10/29/24 0853    furosemide (LASIX) tablet 40 mg  40 mg Oral QAM Fatmata Wilcox CNP   40 mg at 10/29/24 0853    gabapentin (NEURONTIN) capsule 400 mg  400 mg Oral TID Gunjan Lucas NP   400 mg at 10/29/24 2010    hydrOXYzine HCl (ATARAX) tablet 25 mg  25 mg Oral 4x Daily PRN Gunjan Lucas NP   25 mg at 10/25/24 2131    lisinopril (ZESTRIL) tablet 20 mg  20 mg Oral QAM Fatmata Wilcox CNP   20 mg at 10/29/24 0853    lurasidone (LATUDA) tablet 20 mg  20 mg Oral Daily with supper Brook Rodriguez APRN CNP   20 mg at 10/29/24 1659    melatonin tablet 3 mg  3 mg Oral At Bedtime PRN Isaiah Elizabeth APRN CNP   3 mg at 10/25/24 2131    metoprolol succinate ER (TOPROL XL) 24 hr tablet 25 mg  25 mg Oral QAM Fatmata Wilcox CNP   25 mg at 10/29/24 0853    multivitamin w/minerals (THERA-VIT-M) tablet 1 tablet  1 tablet Oral QAM Fatmata Wilcox CNP   1 tablet at 10/29/24 0853    nicotine (NICORETTE) gum 2 mg  2 mg Buccal Q1H PRN Isaiah Elizabeth APRN CNP        nitroGLYcerin (NITROSTAT) sublingual tablet 0.4 mg  0.4 mg Sublingual Q5 Min PRN Fatmata Wilcox CNP        OLANZapine (zyPREXA) tablet 2.5 mg  2.5 mg Oral TID PRN Isaiah Elizabeth APRN CNP        Or    OLANZapine (zyPREXA) injection 2.5 mg  2.5 mg Intramuscular TID PRN Isaiah Elizabeth APRN CNP        pantoprazole (PROTONIX) EC tablet 40 mg  40 mg Oral Daily PRN Fatmata Wilcox CNP        QUEtiapine (SEROquel) tablet 25 mg  25 mg Oral At Bedtime Kalani Teran APRN CNP   25 mg at 10/29/24 2010    rosuvastatin (CRESTOR) tablet 20 mg  20 mg Oral At Bedtime Kilo Wilcoxa, CNP   20 mg at 10/29/24 2010    thiamine tablet 100 mg  100 mg Oral QAM Fatmata Wilcox, CNP   100 mg at 10/29/24 0853    tolterodine ER (DETROL LA) 24 hr capsule 4 mg  4 mg Oral NAELM Kilo Wilcoxa, CNP   4 mg at 10/29/24 0853       Reason for OT Referral:  Mental Health History: Hx of ECT and inpatient pscyh  "admits years ago, has been to [facility] in the past  Signs/Symptoms of compliant: flat, depression, word finding difficulty, impaired memory  Aggravating factors/Current Life Stressors: ETOH abuse, lonely and no support system, recent open heart surgery, no purpose  Current Services: therapy and PCP    Personal Information:  Family Structure: limited  Living Arrangement: lives In his own home in [place] with \"housemate\"   Finances: retired  Medication Management: manages his own but states that this does not go well \"there are too many\"   Support System:limited     Physical Presentation:   Mobility: independent  Strength/ROM: WFL   Comfort/Pain: None reported at time of eval  Sensory: no concerns     Self Care:   Nutrition: I have difficulty  Sleep Pattern: I have difficulty  Exercise: I have difficulty  Spiritual Practice: I do okay  Leisure: I have difficulty  Coping Skills: I would like to improve    Cognition:  Orientation:  time, place, and person  Memory: Impaired  Safety awareness: Intact  Attention: Diminished  Motivation: Poor  Judgement/Insight: Diminished  Speech/Language: Normal  Mood: Depressed  Affect: Flat  Thought Content: negative    Goals:   Pt will complete cognitive testing in aide in safe discharge planning.  Pt will verbalize understanding of 1-2 new healthy coping skills to aide in emotion regulation.    Planned Interventions: cog testing, coping skills    Clinical Impressions:  Criteria for Skilled Therapeutic Intervention Met: yes  OT Diagnosis: impaired cognition and impaired coping skills  Influenced by the following impairments: ETOH abuse, age, lonliness  Functional limitations due to impairment: home management, ADLs, med management, meal prep, socialization  Clinical presentation: Stable/Uncomplicated  Clinical presentation rationale: Clinical judgement  Clinical Decision making (complexity): Low Complexity  Predicted Duration of Therapy Intervention (days/wks): 3 x week x 2 weeks  Risks " and Benefits of therapy have been explained: Yes  Patient, Family & other staff in agreement with plan of care: Yes  Comments: pt is appropriate for OT services to address pts cognition to better determine needs and services after discharge. Pt reports limited support and limited healthy coping skills to manage anxiety.  Verbalizes a need to engage in more hobbies and socialization.      Total Evaluation Time: 21

## 2024-10-30 NOTE — PLAN OF CARE
Problem: Suicide Risk  Goal: Absence of Self-Harm  Description: Patient will be free of suicidal ideation or intent by discharge.  Outcome: Progressing     Problem: Depression  Goal: Decreased Depression Symptoms  Description: Patient will verbalize a decrease in depression, and increase in mood by discharge.  Outcome: Progressing     Problem: Adult Behavioral Health Plan of Care  Goal: Patient-Specific Goal (Individualization)  Description: Patient will sleep 6 to 8 hours per night  Patient will eat at least 50% of meals  Patient will attend at least 50% of groups  Patient will comply with recommendations of treatment team  Patient will remain medication compliant  Outcome: Progressing     Face to Face report received from ANETTE Larios. Rounding completed. Patient observed in bed appearing to sleep for most of the night. 15 minute and PRN checks throughout my shift. No reports of falls or self-harm.     Face to face end of shift report communicated to day shift RN.     Myla Osborn RN  10/30/2024  5:12 AM

## 2024-10-30 NOTE — PROGRESS NOTES
Petty from [facility] [phone] called and wanted to check in on pt and see how the pt is doing. She will be calling again next week for an update and is willing to help him with resources if he does not go door to door for CD treatment.

## 2024-10-30 NOTE — PROGRESS NOTES
Researched options for CD referral. [facility] in [place] is a Medicare provider. Patient signed an FAITH for [facility] in [place]. Referral sent to the [facility] in [place].

## 2024-10-31 ENCOUNTER — APPOINTMENT (OUTPATIENT)
Dept: OCCUPATIONAL THERAPY | Facility: HOSPITAL | Age: 74
DRG: 885 | End: 2024-10-31
Payer: COMMERCIAL

## 2024-10-31 LAB
ANION GAP SERPL CALCULATED.3IONS-SCNC: 14 MMOL/L (ref 7–15)
BUN SERPL-MCNC: 26.9 MG/DL (ref 8–23)
CALCIUM SERPL-MCNC: 9.5 MG/DL (ref 8.8–10.4)
CHLORIDE SERPL-SCNC: 103 MMOL/L (ref 98–107)
CREAT SERPL-MCNC: 1.16 MG/DL (ref 0.67–1.17)
EGFRCR SERPLBLD CKD-EPI 2021: 67 ML/MIN/1.73M2
EST. AVERAGE GLUCOSE BLD GHB EST-MCNC: 128 MG/DL
GLUCOSE BLDC GLUCOMTR-MCNC: 82 MG/DL (ref 70–99)
GLUCOSE SERPL-MCNC: 201 MG/DL (ref 70–99)
HBA1C MFR BLD: 6.1 %
HCO3 SERPL-SCNC: 22 MMOL/L (ref 22–29)
POTASSIUM SERPL-SCNC: 4.4 MMOL/L (ref 3.4–5.3)
SODIUM SERPL-SCNC: 139 MMOL/L (ref 135–145)

## 2024-10-31 PROCEDURE — 250N000013 HC RX MED GY IP 250 OP 250 PS 637: Performed by: NURSE PRACTITIONER

## 2024-10-31 PROCEDURE — 80048 BASIC METABOLIC PNL TOTAL CA: CPT | Performed by: NURSE PRACTITIONER

## 2024-10-31 PROCEDURE — 124N000001 HC R&B MH

## 2024-10-31 PROCEDURE — 99232 SBSQ HOSP IP/OBS MODERATE 35: CPT | Performed by: NURSE PRACTITIONER

## 2024-10-31 PROCEDURE — 97535 SELF CARE MNGMENT TRAINING: CPT | Mod: GO

## 2024-10-31 PROCEDURE — 36415 COLL VENOUS BLD VENIPUNCTURE: CPT | Performed by: NURSE PRACTITIONER

## 2024-10-31 RX ADMIN — FUROSEMIDE 40 MG: 20 TABLET ORAL at 08:39

## 2024-10-31 RX ADMIN — GABAPENTIN 400 MG: 400 CAPSULE ORAL at 13:54

## 2024-10-31 RX ADMIN — ASPIRIN 81 MG: 81 TABLET, COATED ORAL at 08:39

## 2024-10-31 RX ADMIN — Medication 100 MG: at 08:39

## 2024-10-31 RX ADMIN — ROSUVASTATIN CALCIUM 20 MG: 10 TABLET, FILM COATED ORAL at 19:34

## 2024-10-31 RX ADMIN — TOLTERODINE 4 MG: 4 CAPSULE, EXTENDED RELEASE ORAL at 08:39

## 2024-10-31 RX ADMIN — EMPAGLIFLOZIN 10 MG: 10 TABLET, FILM COATED ORAL at 08:39

## 2024-10-31 RX ADMIN — LURASIDONE HYDROCHLORIDE 20 MG: 20 TABLET, COATED ORAL at 17:24

## 2024-10-31 RX ADMIN — METOPROLOL SUCCINATE 25 MG: 25 TABLET, EXTENDED RELEASE ORAL at 08:39

## 2024-10-31 RX ADMIN — FLUTICASONE FUROATE AND VILANTEROL TRIFENATATE 1 PUFF: 100; 25 POWDER RESPIRATORY (INHALATION) at 08:39

## 2024-10-31 RX ADMIN — GABAPENTIN 400 MG: 400 CAPSULE ORAL at 08:39

## 2024-10-31 RX ADMIN — DULOXETINE HYDROCHLORIDE 60 MG: 30 CAPSULE, DELAYED RELEASE PELLETS ORAL at 08:39

## 2024-10-31 RX ADMIN — HYDROXYZINE HYDROCHLORIDE 25 MG: 25 TABLET ORAL at 19:34

## 2024-10-31 RX ADMIN — QUETIAPINE FUMARATE 12.5 MG: 25 TABLET ORAL at 19:35

## 2024-10-31 RX ADMIN — Medication 1 TABLET: at 08:39

## 2024-10-31 RX ADMIN — DOCUSATE SODIUM 100 MG: 100 CAPSULE, LIQUID FILLED ORAL at 08:39

## 2024-10-31 RX ADMIN — LISINOPRIL 20 MG: 20 TABLET ORAL at 08:39

## 2024-10-31 RX ADMIN — GABAPENTIN 400 MG: 400 CAPSULE ORAL at 19:33

## 2024-10-31 ASSESSMENT — ACTIVITIES OF DAILY LIVING (ADL)
ADLS_ACUITY_SCORE: 0
DRESS: INDEPENDENT;SCRUBS (BEHAVIORAL HEALTH)
ADLS_ACUITY_SCORE: 0
HYGIENE/GROOMING: INDEPENDENT
ORAL_HYGIENE: INDEPENDENT
ADLS_ACUITY_SCORE: 0
LAUNDRY: UNABLE TO COMPLETE
ADLS_ACUITY_SCORE: 0
ADLS_ACUITY_SCORE: 0

## 2024-10-31 NOTE — PROGRESS NOTES
10/31/24 1500   Appointment Info   Signing Clinician's Name / Credentials (OT) Crys Mendez OTR/L   Cognitive Screens/Assessments   Cognitive Assessments Completed CPT   Cognitive Performance Test (CPT) Score 6.0/6.0   CPT Domains assessed: working memory/executive function processing capabilities via activity based assessment   CPT Interpretation Score indicates that the pt is independent with medication managment.   Self-Care/Home Management   Self-Care/Home Mgmt/ADL, Compensatory, Meal Prep Minutes (88274) 30   Symptoms Noted During/After Treatment (Meal Preparation/Planning Training) none   Treatment Detail/Skilled Intervention Med CPT completed and scored 6.0/6.0.  Pt reports that he knows how to take his meds but he often forgets to take his meds especially in the morning when hung over.  States that he takes so many meds that he worries they are causing side effects or unsafe to be taking together.  Encouraged pt to talk to his provider about this.  Pt also states that he is on the waitlist for [facility] senior living apts in [place].  Is wanting to move there as he feels it would be good for socialization, sobriety and healthy meals.   OT Discharge Planning   OT Plan Conitnue for cog and coping skills   OT Discharge Recommendation (DC Rec) other (see comments)  (Senior living apt vs assisted living)   OT Rationale for DC Rec Clincal judgement   OT Brief overview of current status Med CPT completed and scored 6.0/6.0   Total Session Time   Timed Code Treatment Minutes 30   Total Session Time (sum of timed and untimed services) 30

## 2024-10-31 NOTE — PROGRESS NOTES
[facility]  PROGRESS NOTE     SUBJECTIVE     Prior to interviewing the patient, I met with nursing and reviewed patient's clinical condition. We discussed clinical care both before and after the interview. I have reviewed the patient's clinical course by review of records including previous notes, labs, and vital signs.     Per nursing, the patient had the following behavioral events over the last 24-hours: none.    On psychiatric interview, patient reports he is feeling somewhat better today. He's attending groups and engaging with peers. Appetite is normal. He is sleeping well. Continues to worry about drinking when he returns home.     Spoke with the [facility] unit today, they were concerned with his cognitive ability and didn't believe he would be a good candidate for their program however were willing to give him in interview and will call to set this up.         MEDICATIONS   Scheduled Meds:  Current Facility-Administered Medications   Medication Dose Route Frequency Provider Last Rate Last Admin    aspirin EC tablet 81 mg  81 mg Oral QAM Kilo Wilcoxa CNP   81 mg at 10/30/24 0900    docusate sodium (COLACE) capsule 100 mg  100 mg Oral Daily Fatmata Wilcox CNP   100 mg at 10/30/24 0859    DULoxetine (CYMBALTA) DR capsule 60 mg  60 mg Oral QAM Brook Rodriguez APRN CNP   60 mg at 10/30/24 0901    empagliflozin (JARDIANCE) tablet 10 mg  10 mg Oral QAM Kilo Wilcoxa, CNP   10 mg at 10/30/24 0859    fluticasone-vilanterol (BREO ELLIPTA) 100-25 MCG/ACT inhaler 1 puff  1 puff Inhalation QAM Fatmata Wilcox CNP   1 puff at 10/30/24 0901    furosemide (LASIX) tablet 40 mg  40 mg Oral QAM Redmaryanne Fatmata, CNP   40 mg at 10/30/24 0901    gabapentin (NEURONTIN) capsule 400 mg  400 mg Oral TID Gunjan Lucas NP   400 mg at 10/30/24 2055    lisinopril (ZESTRIL) tablet 20 mg  20 mg Oral QAM Cindi Wilcoxesa, CNP   20 mg at 10/30/24 0859    lurasidone (LATUDA) tablet 20 mg  20 mg Oral Daily with supper  Brook Rodriguez APRN CNP   20 mg at 10/30/24 1714    metoprolol succinate ER (TOPROL XL) 24 hr tablet 25 mg  25 mg Oral QAFatmata Luis CNP   25 mg at 10/30/24 0859    multivitamin w/minerals (THERA-VIT-M) tablet 1 tablet  1 tablet Oral QAM Fatmata Wilcox CNP   1 tablet at 10/30/24 0900    QUEtiapine (SEROquel) tablet 25 mg  25 mg Oral At Bedtime Kalani Teran APRN CNP   25 mg at 10/30/24 2055    rosuvastatin (CRESTOR) tablet 20 mg  20 mg Oral At Bedtime Fatmata Wilcox CNP   20 mg at 10/30/24 2055    thiamine tablet 100 mg  100 mg Oral QAM Fatmata Wilcox CNP   100 mg at 10/30/24 0900    tolterodine ER (DETROL LA) 24 hr capsule 4 mg  4 mg Oral QAFatmata Luis CNP   4 mg at 10/30/24 0900     PRN Meds:.  Current Facility-Administered Medications   Medication Dose Route Frequency Provider Last Rate Last Admin    acetaminophen (TYLENOL) tablet 650 mg  650 mg Oral Q4H PRN Isaiah Elizabeth APRN CNP        albuterol (PROVENTIL HFA/VENTOLIN HFA) inhaler  2 puff Inhalation Q4H PRN Fatmata Wilcox CNP   2 puff at 10/25/24 1242    alum & mag hydroxide-simethicone (MAALOX) suspension 30 mL  30 mL Oral Q4H PRN Isaiah Elizabeth APRN CNP        calcium carbonate (TUMS) chewable tablet 500 mg  500 mg Oral Daily PRN Kalani Teran APRN CNP        hydrOXYzine HCl (ATARAX) tablet 25 mg  25 mg Oral 4x Daily PRN Gunjan Lucas NP   25 mg at 10/25/24 2131    melatonin tablet 3 mg  3 mg Oral At Bedtime PRN Isaiah Elizabeth APRN CNP   3 mg at 10/25/24 2131    nicotine (NICORETTE) gum 2 mg  2 mg Buccal Q1H PRN Isaiah Elizabeth APRN CNP        nitroGLYcerin (NITROSTAT) sublingual tablet 0.4 mg  0.4 mg Sublingual Q5 Min PRN Fatmata Wilcox CNP        OLANZapine (zyPREXA) tablet 2.5 mg  2.5 mg Oral TID PRN Isaiah Elizabeth APRN CNP   2.5 mg at 10/30/24 1113    Or    OLANZapine (zyPREXA) injection 2.5 mg  2.5 mg Intramuscular TID PRN Isaiah Elizabeth APRN CNP        pantoprazole (PROTONIX) EC tablet 40 mg  40 mg Oral Daily PRN Jeri,  "SERA Avilez            ALLERGIES   No Known Allergies     MENTAL STATUS EXAM   Vitals: /66   Pulse 66   Temp 96.9  F (36.1  C) (Temporal)   Resp 18   Wt 109.4 kg (241 lb 1.6 oz)   SpO2 98%   BMI 32.70 kg/m      Appearance:  alert, has showered and changed his clothes today.   Attitude:  cooperative  Eye Contact:  good  Mood:  depressed  Affect:  mood congruent  Speech:  clear, coherent and slight SOB noted with speaking  Psychomotor Behavior:  no evidence of tardive dyskinesia, dystonia, or tics  Thought Process:  logical and linear  Associations:  no loose associations  Thought Content:  no evidence of psychotic thought and passive suicidal ideation present - no plan or intent, hopelessness remains evident  Insight:  limited  Judgment:  limited  Oriented to:  time, person, and place  Attention Span and Concentration:  fair  Recent and Remote Memory:  limited/fair  Fund of Knowledge: appropriate  Muscle Strength and Tone: normal  Gait and Station: Normal         LABS   No results found for this or any previous visit (from the past 24 hours).        IMPRESSION   This is a 74 year old male with a PMH of MDD, CHELSIE, and alcohol abuse who presents to the ED for evaluation of anxiety and depression. Patient reported taking three tablets of his Ativan this morning in an attempt to help his anxiety, though started to feel tired and weak. Tried to get into [facility] though they were full. Today patient denies any SI. He reports feeling more depressed and anxiety over past few weeks, more so the past few days. He notes that he feels very isolated and lonely since retiring from work as a . He has no family or children. He reports feeling he has \"no purpose\". Patient notes that he is interested in increasing his mental health services and looking into volunteer options. He has longer term plan of getting into an assisted living. Is in agreement to continue Cymbalta, Melatonin, Gabapentin. He does not want to " continue Ativan and is aware of the risks in taking this medication given his age and his ongoing alcohol use. He was not taking Naltrexone at home, though would be interested in restarting this prior to discharge. Patient is in agreement to voluntary admission for stabilization. He notes that he may be interested in stepping down from the hospital to [facility] prior to return home, can look into this closer to discharge.     Today: Tolerating medication changes without SE. Continues with passive SI. CD assessment recommends residential treatment, referral pending. High likelihood he will relapse if discharged home. No change in medications today.     DIAGNOSES     #.Major depressive disorder, recurrent, severe  #. Alcohol use disorder, severe  #. Generalized anxiety disorder       PLAN     Location: Unit 5  Legal Status: Orders Placed This Encounter      Voluntary    Safety Assessment:    Behavioral Orders   Procedures    Code 1 - Restrict to Unit    Routine Programming     As clinically indicated    Status 15     Every 15 minutes.    Suicide precautions: Suicide Risk: LOW; Clinical rationale to override score: modification to the care environment     Consider searching patient belongings according to policy for contraband or items that could be used for self-harm.     Order Specific Question:   Suicide Risk     Answer:   LOW     Order Specific Question:   Clinical rationale to override score:     Answer:   modification to the care environment      PTA psychotropic medications stopped:     -    PTA psychotropic medications continued/changed:     - duloxetine 90 mg qAM -> reduced to 60 mg 10/29    New medications tried and stopped:   Valium 5 mg BID X 2 doses.    New medications initiated:   Seroquel 25 mg at hs -> reduced to 12.5 d/t elevated BG  Latuda 20 mg with supper -10/29    Today's Changes:   - Seroquel reduced to 12.5 d/t elevated BG    Programming: Patient will be treated in a therapeutic milieu with  appropriate individual and group therapies. Education will be provided on diagnoses, medications, and treatments.     Medical diagnoses:  Per medicine    Consult: CD evaluation, OT evaluation - cognitive  Tests: None    Anticipated LOS: 5-7 days  Disposition: home with services.       TREATMENT TEAM CARE PLAN     Progress: Continued symptoms.    Continued Stay Criteria/Rationale: Continued symptoms without sufficient improvement/resolution.    Medical/Physical: See above.    Precautions: See above.     Plan: Continue inpatient care with unit support and medication management.    Rationale for change in precautions or plan: NA due to no change.    Participants: YOSSI Callejas, DEAN-BC, FNP-C    The patient's care was discussed with the treatment team and chart notes were reviewed.       ATTESTATION      YOSSI Callejas, DEAN-BC, FNP-C     -7

## 2024-10-31 NOTE — PLAN OF CARE
PT reports he does not feel a lot better since arrival because he knows that he will at some point go back to being alone at home. He reports he would love an assisted living but cannot afford out of pocket. He reports he feels hopeful at times, and has been feeling physically better not drinking.   He denies SI/HI and hallucinations.   Attending groups.  Social in [facility], working on a puzzle.     Face to face end of shift report communicated to oncoming RN     Soledad Contreras RN  10/30/2024  10:40 PM                           Problem: Adult Behavioral Health Plan of Care  Goal: Patient-Specific Goal (Individualization)  Description: Patient will sleep 6 to 8 hours per night  Patient will eat at least 50% of meals  Patient will attend at least 50% of groups  Patient will comply with recommendations of treatment team  Patient will remain medication compliant      Outcome: Progressing     Problem: Depression  Goal: Decreased Depression Symptoms  Description: Patient will verbalize a decrease in depression, and increase in mood by discharge.  Outcome: Progressing     Problem: Suicide Risk  Goal: Absence of Self-Harm  Description: Patient will be free of suicidal ideation or intent by discharge.  Outcome: Progressing     Problem: Adult Behavioral Health Plan of Care  Goal: Plan of Care Review  Recent Flowsheet Documentation  Taken 10/30/2024 1800 by Soledad Contreras RN  Patient Agreement with Plan of Care: agrees  Goal: Adheres to Safety Considerations for Self and Others  Intervention: Develop and Maintain Individualized Safety Plan  Recent Flowsheet Documentation  Taken 10/30/2024 1800 by Soledad Contreras RN  Safety Measures:   safety plan reviewed   safety rounds completed   Goal Outcome Evaluation:    Plan of Care Reviewed With: patient

## 2024-10-31 NOTE — PROGRESS NOTES
Called [facility] Unit to check status of referral. Left message requesting response.   Patient signed FAITH for [facility]. Sent referral to [facility] for CD treatment.

## 2024-10-31 NOTE — PLAN OF CARE
Face to face end of shift report received from Nikolai VALLADARES RN. Rounding completed. [place] [facility] [place]     [place] has been calm, cooperative this shift. He reports that he felt more tired yesterday morning as well as this morning. He questions Seroquel hodge.Informed him that the provider did decrease his Seroquel dose. Pt denied any SI/HI and AH/VH. Denied pain. He has attended groups throughout the day with active participation. Pt showered this shift. He is able to make his needs known. Steady gait- no falls. Frequent rounding.     Problem: Adult Behavioral Health Plan of Care  Goal: Patient-Specific Goal (Individualization)  Description: Patient will sleep 6 to 8 hours per night  Patient will eat at least 50% of meals  Patient will attend at least 50% of groups  Patient will comply with recommendations of treatment team  Patient will remain medication compliant      Outcome: Progressing     Problem: Depression  Goal: Decreased Depression Symptoms  Description: Patient will verbalize a decrease in depression, and increase in mood by discharge.  Outcome: Progressing     Problem: Suicide Risk  Goal: Absence of Self-Harm  Description: Patient will be free of suicidal ideation or intent by discharge.  Outcome: Progressing       Maxine Killian RN  10/31/2024  11:48 AM

## 2024-10-31 NOTE — PLAN OF CARE
Face to face end of shift report received from day RN. Rounding completed. Patient observed sitting in the lounge watching television with peers.     Joss Perez, RN  10/31/2024  1615    Patient spent most of shift watching TV with peers, attending group, and resting in bed. Denies SI/HI/AVH/pain. Endorses depression and anxiety, PRN Atarax given with good effect. Pt stated the meditation group was helpful, but the second time he tried to meditate it made him more anxious. Appetite/hygiene good. Makes needs known.    Report will be given to night RN at shift change.    Problem: Adult Behavioral Health Plan of Care  Goal: Patient-Specific Goal (Individualization)  Description: Patient will sleep 6 to 8 hours per night  Patient will eat at least 50% of meals  Patient will attend at least 50% of groups  Patient will comply with recommendations of treatment team  Patient will remain medication compliant      Outcome: Progressing     Problem: Depression  Goal: Decreased Depression Symptoms  Description: Patient will verbalize a decrease in depression, and increase in mood by discharge.  Outcome: Progressing     Problem: Suicide Risk  Goal: Absence of Self-Harm  Description: Patient will be free of suicidal ideation or intent by discharge.  Outcome: Progressing

## 2024-10-31 NOTE — PLAN OF CARE
Face to face end of shift report received from Soledad CHEEMA. Rounding completed. Patient observed in bed appears asleep.     Problem: Adult Behavioral Health Plan of Care  Goal: Patient-Specific Goal (Individualization)  Description: Patient will sleep 6 to 8 hours per night  Patient will eat at least 50% of meals  Patient will attend at least 50% of groups  Patient will comply with recommendations of treatment team  Patient will remain medication compliant      Outcome: Progressing     Problem: Depression  Goal: Decreased Depression Symptoms  Description: Patient will verbalize a decrease in depression, and increase in mood by discharge.  Outcome: Progressing     Problem: Suicide Risk  Goal: Absence of Self-Harm  Description: Patient will be free of suicidal ideation or intent by discharge.  Outcome: Progressing   Goal Outcome Evaluation:         No observed episodes of SIB this shift. Patient slept (7) hours. Face to face end of shift report communicated to oncoming shift.     Nikolai Beckford RN  10/31/2024  0607 AM

## 2024-11-01 ENCOUNTER — APPOINTMENT (OUTPATIENT)
Dept: OCCUPATIONAL THERAPY | Facility: HOSPITAL | Age: 74
DRG: 885 | End: 2024-11-01
Payer: COMMERCIAL

## 2024-11-01 LAB
GLUCOSE BLDC GLUCOMTR-MCNC: 111 MG/DL (ref 70–99)
GLUCOSE BLDC GLUCOMTR-MCNC: 111 MG/DL (ref 70–99)

## 2024-11-01 PROCEDURE — 250N000013 HC RX MED GY IP 250 OP 250 PS 637: Performed by: NURSE PRACTITIONER

## 2024-11-01 PROCEDURE — 99232 SBSQ HOSP IP/OBS MODERATE 35: CPT | Performed by: NURSE PRACTITIONER

## 2024-11-01 PROCEDURE — 97535 SELF CARE MNGMENT TRAINING: CPT | Mod: GO

## 2024-11-01 PROCEDURE — 124N000001 HC R&B MH

## 2024-11-01 RX ORDER — DOCUSATE SODIUM 100 MG/1
100 CAPSULE, LIQUID FILLED ORAL 2 TIMES DAILY
Status: DISCONTINUED | OUTPATIENT
Start: 2024-11-01 | End: 2024-11-05 | Stop reason: HOSPADM

## 2024-11-01 RX ORDER — POLYETHYLENE GLYCOL 3350 17 G/17G
17 POWDER, FOR SOLUTION ORAL DAILY
Status: DISCONTINUED | OUTPATIENT
Start: 2024-11-01 | End: 2024-11-05 | Stop reason: HOSPADM

## 2024-11-01 RX ADMIN — GABAPENTIN 400 MG: 400 CAPSULE ORAL at 08:15

## 2024-11-01 RX ADMIN — EMPAGLIFLOZIN 10 MG: 10 TABLET, FILM COATED ORAL at 08:15

## 2024-11-01 RX ADMIN — FLUTICASONE FUROATE AND VILANTEROL TRIFENATATE 1 PUFF: 100; 25 POWDER RESPIRATORY (INHALATION) at 08:19

## 2024-11-01 RX ADMIN — DOCUSATE SODIUM 100 MG: 100 CAPSULE, LIQUID FILLED ORAL at 08:17

## 2024-11-01 RX ADMIN — QUETIAPINE FUMARATE 12.5 MG: 25 TABLET ORAL at 20:03

## 2024-11-01 RX ADMIN — GABAPENTIN 400 MG: 400 CAPSULE ORAL at 13:17

## 2024-11-01 RX ADMIN — HYDROXYZINE HYDROCHLORIDE 25 MG: 25 TABLET ORAL at 15:44

## 2024-11-01 RX ADMIN — GABAPENTIN 400 MG: 400 CAPSULE ORAL at 20:03

## 2024-11-01 RX ADMIN — DOCUSATE SODIUM 100 MG: 100 CAPSULE, LIQUID FILLED ORAL at 20:03

## 2024-11-01 RX ADMIN — POLYETHYLENE GLYCOL 3350 17 G: 17 POWDER, FOR SOLUTION ORAL at 16:43

## 2024-11-01 RX ADMIN — TOLTERODINE 4 MG: 4 CAPSULE, EXTENDED RELEASE ORAL at 08:16

## 2024-11-01 RX ADMIN — LISINOPRIL 20 MG: 20 TABLET ORAL at 08:16

## 2024-11-01 RX ADMIN — ROSUVASTATIN CALCIUM 20 MG: 10 TABLET, FILM COATED ORAL at 20:03

## 2024-11-01 RX ADMIN — ASPIRIN 81 MG: 81 TABLET, COATED ORAL at 08:15

## 2024-11-01 RX ADMIN — Medication 100 MG: at 08:16

## 2024-11-01 RX ADMIN — DULOXETINE HYDROCHLORIDE 60 MG: 30 CAPSULE, DELAYED RELEASE PELLETS ORAL at 08:17

## 2024-11-01 RX ADMIN — Medication 1 TABLET: at 08:16

## 2024-11-01 RX ADMIN — LURASIDONE HYDROCHLORIDE 20 MG: 20 TABLET, COATED ORAL at 16:43

## 2024-11-01 RX ADMIN — FUROSEMIDE 40 MG: 20 TABLET ORAL at 08:17

## 2024-11-01 ASSESSMENT — ACTIVITIES OF DAILY LIVING (ADL)
ADLS_ACUITY_SCORE: 0
ORAL_HYGIENE: INDEPENDENT
ADLS_ACUITY_SCORE: 0
DRESS: SCRUBS (BEHAVIORAL HEALTH);INDEPENDENT
HYGIENE/GROOMING: INDEPENDENT
LAUNDRY: UNABLE TO COMPLETE
ADLS_ACUITY_SCORE: 0

## 2024-11-01 NOTE — PROGRESS NOTES
"   11/01/24 1000   Appointment Info   Signing Clinician's Name / Credentials (OT) Crys Mendez, OTR/L   Cognitive Screens/Assessments   Cognitive Assessments Completed Other Cognitive Screen/Assessment  (CRISTI)   Cognitive Assessment Test CRISTI   Cognitive Assessment Test Score Scored independent in areas of appeerance, safety and health, cash for purchasing items, obtaining source of income, mobility in the community and use of the telephone.  Scored \"needs assistance in areas of frequency of self care, payment of bills and leisure activity involvement.   Cognitive Assessment Test Interpretation Score indicates that the pt could live alone with with daily assistance and encouragement for hygiene, in-person visits for several hours and leisure activity participation.   Self-Care/Home Management   Self-Care/Home Mgmt/ADL, Compensatory, Meal Prep Minutes (81099) 35   Symptoms Noted During/After Treatment (Meal Preparation/Planning Training) none   Treatment Detail/Skilled Intervention CRISTI completed, see above for score and recommendations   OT Discharge Planning   OT Plan Continue for coping skills   OT Discharge Recommendation (DC Rec) other (see comments)  ([facility] living vs [facility])   OT Rationale for DC Rec Clincal judgement   OT Brief overview of current status CRISTI completed, see above for recommendations and results.   Total Session Time   Timed Code Treatment Minutes 35   Total Session Time (sum of timed and untimed services) 35       "

## 2024-11-01 NOTE — PROGRESS NOTES
[facility] PSYCHIATRY  PROGRESS NOTE     SUBJECTIVE     Prior to interviewing the patient, I met with nursing and reviewed patient's clinical condition. We discussed clinical care both before and after the interview. I have reviewed the patient's clinical course by review of records including previous notes, labs, and vital signs.     Per nursing, the patient had the following behavioral events over the last 24-hours: none.    On psychiatric interview, patient reports he is feeling feeling the same. He's attending groups and engaging with peers. Appetite is normal. He slept fine last night with reduced dose of Seroquel. Continues to worry about drinking when he returns home.  We talked about resources he has, his concern with relapse and choices he has to care for himself vs being in a facility where he is cared for and not making his own decisions.     Current plan is for discharge on Monday. Will continue introducing the discharge plan to him. He has appointments set up for therapy and resources available within [facility], due to insurance limitations will provide AA resources for him and stressed the 90 meetings in 90 days program and should get a sponsor. Will get resources for PCA for him to set up as well.         MEDICATIONS   Scheduled Meds:  Current Facility-Administered Medications   Medication Dose Route Frequency Provider Last Rate Last Admin    aspirin EC tablet 81 mg  81 mg Oral Fatmata Velasquez CNP   81 mg at 10/31/24 0839    docusate sodium (COLACE) capsule 100 mg  100 mg Oral Daily Fatmata Wilcox CNP   100 mg at 10/31/24 0839    DULoxetine (CYMBALTA) DR capsule 60 mg  60 mg Oral QABrook Javed APRN CNP   60 mg at 10/31/24 0839    empagliflozin (JARDIANCE) tablet 10 mg  10 mg Oral QAFatmata Luis CNP   10 mg at 10/31/24 0839    fluticasone-vilanterol (BREO ELLIPTA) 100-25 MCG/ACT inhaler 1 puff  1 puff Inhalation Fatmata Velasquez CNP   1 puff at 10/31/24 0839    furosemide (LASIX)  tablet 40 mg  40 mg Oral QAM RedFatmata madden CNP   40 mg at 10/31/24 0839    gabapentin (NEURONTIN) capsule 400 mg  400 mg Oral TID Gunjan Lucas NP   400 mg at 10/31/24 1933    lisinopril (ZESTRIL) tablet 20 mg  20 mg Oral QAM Fatmata Wilcox CNP   20 mg at 10/31/24 0839    lurasidone (LATUDA) tablet 20 mg  20 mg Oral Daily with supper Brook Rodriguez APRN CNP   20 mg at 10/31/24 1724    metoprolol succinate ER (TOPROL XL) 24 hr tablet 25 mg  25 mg Oral QAM RedFatmata madden CNP   25 mg at 10/31/24 0839    multivitamin w/minerals (THERA-VIT-M) tablet 1 tablet  1 tablet Oral QAM Fatmata Wilcox CNP   1 tablet at 10/31/24 0839    QUEtiapine (SEROquel) half-tab 12.5 mg  12.5 mg Oral At Bedtime Brook Rodriguez APRN CNP   12.5 mg at 10/31/24 1935    rosuvastatin (CRESTOR) tablet 20 mg  20 mg Oral At Bedtime Fatmata Wilcox CNP   20 mg at 10/31/24 1934    thiamine tablet 100 mg  100 mg Oral QAM Kilo Wilcoxa, CNP   100 mg at 10/31/24 0839    tolterodine ER (DETROL LA) 24 hr capsule 4 mg  4 mg Oral QAM Fatmata Wilcox CNP   4 mg at 10/31/24 0839     PRN Meds:.  Current Facility-Administered Medications   Medication Dose Route Frequency Provider Last Rate Last Admin    acetaminophen (TYLENOL) tablet 650 mg  650 mg Oral Q4H PRN Isaiah Elizabeth APRN CNP        albuterol (PROVENTIL HFA/VENTOLIN HFA) inhaler  2 puff Inhalation Q4H PRN Fatmata Wilcox CNP   2 puff at 10/25/24 1242    alum & mag hydroxide-simethicone (MAALOX) suspension 30 mL  30 mL Oral Q4H PRN Isaiah Elizabeth APRN CNP        calcium carbonate (TUMS) chewable tablet 500 mg  500 mg Oral Daily PRN Kalani Teran APRN CNP        hydrOXYzine HCl (ATARAX) tablet 25 mg  25 mg Oral 4x Daily PRN Gunjan Lucas NP   25 mg at 10/31/24 1934    melatonin tablet 3 mg  3 mg Oral At Bedtime PRN Isaiah Elizabeth APRN CNP   3 mg at 10/25/24 2131    nicotine (NICORETTE) gum 2 mg  2 mg Buccal Q1H PRN Isaiah Elizabeth APRN CNP        nitroGLYcerin (NITROSTAT) sublingual  tablet 0.4 mg  0.4 mg Sublingual Q5 Min PRN Fatmata Wilcox CNP        OLANZapine (zyPREXA) tablet 2.5 mg  2.5 mg Oral TID PRN Isaiah Elizabeth APRN CNP   2.5 mg at 10/30/24 1113    Or    OLANZapine (zyPREXA) injection 2.5 mg  2.5 mg Intramuscular TID PRN Isaiah Elizabeth APRN CNP        pantoprazole (PROTONIX) EC tablet 40 mg  40 mg Oral Daily PRN Fatmata Wilcox CNP            ALLERGIES   No Known Allergies     MENTAL STATUS EXAM   Vitals: /73   Pulse 61   Temp 97.4  F (36.3  C) (Temporal)   Resp 16   Wt 109.5 kg (241 lb 4.8 oz)   SpO2 97%   BMI 32.73 kg/m      Appearance:  alert, oriented  Attitude:  resistant  Eye Contact:  good  Mood:  depressed  Affect:  mood congruent  Speech:  clear, coherent and slight SOB noted with speaking  Psychomotor Behavior:  no evidence of tardive dyskinesia, dystonia, or tics  Thought Process:  logical and linear  Associations:  no loose associations  Thought Content:  no evidence of psychotic thought and passive suicidal ideation present - no plan or intent  Insight:  limited  Judgment:  limited  Oriented to:  time, person, and place  Attention Span and Concentration:  fair  Recent and Remote Memory:  limited/fair  Fund of Knowledge: appropriate  Muscle Strength and Tone: normal  Gait and Station: Normal       LABS   Recent Results (from the past 24 hours)   Basic metabolic panel    Collection Time: 10/31/24  8:40 AM   Result Value Ref [facility]    Sodium 139 135 - 145 mmol/L    Potassium 4.4 3.4 - 5.3 mmol/L    Chloride 103 98 - 107 mmol/L    Carbon Dioxide (CO2) 22 22 - 29 mmol/L    Anion Gap 14 7 - 15 mmol/L    Urea Nitrogen 26.9 (H) 8.0 - 23.0 mg/dL    Creatinine 1.16 0.67 - 1.17 mg/dL    GFR Estimate 67 >60 mL/min/1.73m2    Calcium 9.5 8.8 - 10.4 mg/dL    Glucose 201 (H) 70 - 99 mg/dL   Glucose by meter    Collection Time: 10/31/24  5:05 PM   Result Value Ref [facility]    GLUCOSE BY METER POCT 82 70 - 99 mg/dL   Glucose by meter    Collection Time: 11/01/24  6:04 AM   Result  "Value Ref [facility]    GLUCOSE BY METER POCT 111 (H) 70 - 99 mg/dL           IMPRESSION   This is a 74 year old male with a PMH of MDD, CHELSIE, and alcohol abuse who presents to the ED for evaluation of anxiety and depression. Patient reported taking three tablets of his Ativan this morning in an attempt to help his anxiety, though started to feel tired and weak. Tried to get into [facility] though they were full. Today patient denies any SI. He reports feeling more depressed and anxiety over past few weeks, more so the past few days. He notes that he feels very isolated and lonely since retiring from work as a . He has no family or children. He reports feeling he has \"no purpose\". Patient notes that he is interested in increasing his mental health services and looking into volunteer options. He has longer term plan of getting into an assisted living. Is in agreement to continue Cymbalta, Melatonin, Gabapentin. He does not want to continue Ativan and is aware of the risks in taking this medication given his age and his ongoing alcohol use. He was not taking Naltrexone at home, though would be interested in restarting this prior to discharge. Patient is in agreement to voluntary admission for stabilization. He notes that he may be interested in stepping down from the hospital to [facility] prior to return home, can look into this closer to discharge.     Today: Tolerating medication changes without SE. Continues with passive SI. CD assessment recommends residential treatment, referral pending however his insurance is very limited as to what what is covered. Will continue with brief cognitive therapy and encourage Jeffrey to apply his knowledge and resources into his actions. If he is unable to do this, would consider assisted living facility, however currently he is physically and cognitively healthy enough to live independently.      DIAGNOSES     #.Major depressive disorder, recurrent, severe  #. Alcohol use disorder, " severe  #. Generalized anxiety disorder       PLAN     Location: Unit 5  Legal Status: Orders Placed This Encounter      Voluntary    Safety Assessment:    Behavioral Orders   Procedures    Code 1 - Restrict to Unit    Routine Programming     As clinically indicated    Status 15     Every 15 minutes.    Suicide precautions: Suicide Risk: LOW; Clinical rationale to override score: modification to the care environment     Consider searching patient belongings according to policy for contraband or items that could be used for self-harm.     Order Specific Question:   Suicide Risk     Answer:   LOW     Order Specific Question:   Clinical rationale to override score:     Answer:   modification to the care environment      PTA psychotropic medications stopped:     -    PTA psychotropic medications continued/changed:     - duloxetine 90 mg qAM -> reduced to 60 mg 10/29    New medications tried and stopped:   Valium 5 mg BID X 2 doses.    New medications initiated:   Seroquel 25 mg at hs -> reduced to 12.5 d/t elevated BG  Latuda 20 mg with supper -10/29    Today's Changes:   - Seroquel reduced to 12.5 d/t elevated BG    Programming: Patient will be treated in a therapeutic milieu with appropriate individual and group therapies. Education will be provided on diagnoses, medications, and treatments.     Medical diagnoses:  Per medicine    Consult: CD evaluation, OT evaluation - cognitive  Tests: None    Anticipated LOS: 5-7 days  Disposition: home with services.       TREATMENT TEAM CARE PLAN     Progress: Continued symptoms.    Continued Stay Criteria/Rationale: Continued symptoms without sufficient improvement/resolution.    Medical/Physical: See above.    Precautions: See above.     Plan: Continue inpatient care with unit support and medication management.    Rationale for change in precautions or plan: NA due to no change.    Participants: YOSSI Callejas, PMLUPEP-BC, FNP-C    The patient's care was discussed with  the treatment team and chart notes were reviewed.       ATTESTATION      YOSSI Callejas, PMHNP-BC, FNP-C

## 2024-11-01 NOTE — PLAN OF CARE
Face to face shift report received from Nikolai CHEEMA. Rounding completed, pt observed in lounge at the start of the shift.    Patient in lounge throughout the day. Alert and making needs known. Attending groups. Pleasant and smiling during conversation with this writer. Compliant with scheduled medication and nursing assessment. States he was up a few times throughout the night but feeling rested overall. Up in lounge watching the news. Held Metoprolol due to lower HR. Denies anxiety, hallucinations, SI/HI, and pain. Reports feeling constipated this morning. States last BM was yesterday. Additional medications ordered by provider. Patient states he will hold off until the afternoon. Declined Miralax after minimal results. Spent the afternoon watching television in the lounge. Steady and balanced gait. No falls. Frequent rounding.    Problem: Adult Behavioral Health Plan of Care  Goal: Patient-Specific Goal (Individualization)  Description: Patient will sleep 6 to 8 hours per night  Patient will eat at least 50% of meals  Patient will attend at least 50% of groups  Patient will comply with recommendations of treatment team  Patient will remain medication compliant        Outcome: Progressing     Problem: Depression  Goal: Decreased Depression Symptoms  Description: Patient will verbalize a decrease in depression, and increase in mood by discharge.    Outcome: Progressing     Problem: Suicide Risk  Goal: Absence of Self-Harm  Description: Patient will be free of suicidal ideation or intent by discharge.  Outcome: Progressing    Face to face report will be communicated to oncoming RN.    Estella Serrano RN  11/1/2024

## 2024-11-01 NOTE — PLAN OF CARE
Face to face end of shift report received from Joss CHEEMA. Rounding completed. Patient observed in bed appears asleep.     Problem: Adult Behavioral Health Plan of Care  Goal: Patient-Specific Goal (Individualization)  Description: Patient will sleep 6 to 8 hours per night  Patient will eat at least 50% of meals  Patient will attend at least 50% of groups  Patient will comply with recommendations of treatment team  Patient will remain medication compliant      Outcome: Progressing     Problem: Depression  Goal: Decreased Depression Symptoms  Description: Patient will verbalize a decrease in depression, and increase in mood by discharge.  Outcome: Progressing     Problem: Suicide Risk  Goal: Absence of Self-Harm  Description: Patient will be free of suicidal ideation or intent by discharge.  Outcome: Progressing   Goal Outcome Evaluation:    0604     No observed episodes of SIB this shift. Patient slept (7) hours. Face to face end of shift report communicated to oncoming shift.     Nikolai Beckford RN  11/1/2024  0608 AM

## 2024-11-01 NOTE — PROGRESS NOTES
PCP has been scheduled. PCA resources listed in AVS.     [facility]  [address] [place] [address]  [phone]  PCP: Dr. Veliz- November 8th @ 9:25am

## 2024-11-01 NOTE — PROGRESS NOTES
"[facility]    Medical Services Progress Note    Date of Service (when I saw the patient): 11/01/2024    Assessment & Plan     Principal Problem:    Depression with anxiety     Active Medical Problems:    Essential hypertension- vitals stable. Denies chest pain. Reports chronic SOB, hx of COPD. Not being using his maintenance inhaler.  Home dose of lisinopril  and metoprolol ordered.   10/29- denies chest pain,  denies feeling sob. Vitals stable.   10/30- denies chest pain, worsening sob. Reports a little sob at times due to anxiety. Vitals stable   11/1- vitals stable. Denies chest pain, sob.        COPD (chronic obstructive pulmonary disease) (H)-denies chest pain. Reports chronic SOB. He reports yesterday he was very SOB but states he feels much better this morning. States that especially after using his Breo inhaler today he feels much better. Albuterol inhaler order as needed. Lungs are clear bilaterally. Nursing to continue to monitor.    10/29- Denies chest pain, sob. Using maintenance inhaler daily. Has only utilized albuterol as needed one time on the 25th. Lungs clear bilaterally.   10/30 - denies worsening sob. Reports when he is anxious he feels \"a little short of breath\".  Has not required albuterol inhaler. Nursing to continue to monitor.   11/1- denies new or worsening sob. Relates intermittent sob to anxiety.       Esophageal reflux- denies GERD symptoms. Takes omeprazole as needed. Formulary substitute protonix ordered.   10/29- denies GERD symptoms.   10/30- Denies GERD symptoms.   11/1- denies GERD symptoms.        Hyperlipidemia- home dose of crestor ordered.        Obstructive sleep apnea- repots he is suppose to use a cpap and stopped using it a few months ago. He may use a bed wedge.        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 Denies chest pain, reports chronic sob. Home dose of aspirin, metoprolol, lisinopril, crestor, jardiance, nitroglycerin ordered.      Chronic lower " "extremity edema- 1-2 + bilateral lower extremity edema, hx of heart failure, CABG x 4. He uses DARWIN hose and requests to use his home ones because they are more comfortable and fit him well. Nursing did wash these and order placed for him to use from 7am-7pm. Nursing staff will assist with applying and removing. Home dose of lasix ordered.   10-29- Wearing DARWIN hose. He reports he has had them in place for 24 hours. Bilateral lower extremity trace edema noted. Nursing to remove Darwin hose after 12 hours and leave off 12 hours.   10/30- no worsening edema noted.   11/1- no worsening edema. Continue with DARWIN hose. Nursing to continue to monitor and consult for worsening edema.     Diastolic CHF- its noted this diagnosis could be in part related to recent vein harvest/surgery. Only trace bilateral lower extremity edema noted. Pt states \"I don't know if it's as much short of breath as much as being anxious\". \"People tell me I am short of breath and I don't feel short of breath\". Lungs clear bilaterally. BNP 39 which is not indicate a CHF exacerbation. CBC wnl, CMP shows a mild increase in creatinine and bun. Creatinine 1.12 on admission and  today 1.18. Pt encouraged to increase water  intake. Will recheck renal function in a couple days. Chest xray shows The cardiac silhouette is normal in size. There is irregular distribution of pulmonary vascularity suspicious for emphysema. There  is some linear opacities in both lungs most likely representing scarring. Postoperative changes are seen in the mediastinum. No pleural effusion or pneumothorax. IMPRESSION: Linear atelectasis or scarring in both lungs.Possible emphysema.O2 saturation is stable at 96% on room air. Bp is stable. Will add 2 gram Na restriction, daily weights. Continue with inhalers. Nursing to continue to monitor and consult for new or worsening symptoms.   10/30- stable. No worsening sob. Reports he feels sob when anxious. Denies chest pain, difficulty " breathing. Nursing to continue to monitor and consult for new or worsening symptoms.   11/1- weights remain stable. No chest pain, sob noted.     Prediabetic- A1c in the prediabetic range at 6.1. Discussed with pt and educated on diet, specifically limiting refined carbs and sweets. Pt verbalized understanding. Will hold off on metformin at this time. Primary care appointment will be set up by  for follow up for management.     Pt medically stable, no acute medical concerns. Chronic medical problems stable. Will sign off. Please consult for any new medical issues or concerns.                                                                       Code Status: Full Code.    Fatmata Wilcox CNP        -Data reviewed today: I reviewed all new labs and imaging results over the last 24 hours.     Physical Exam   Temp: 97.4  F (36.3  C) Temp src: Temporal BP: 144/73 Pulse: 61   Resp: 16 SpO2: 97 % O2 Device: None (Room air)    Vitals:    10/29/24 1706 10/30/24 0831 10/31/24 0700   Weight: 111.5 kg (245 lb 14.4 oz) 109.4 kg (241 lb 1.6 oz) 109.5 kg (241 lb 4.8 oz)     Vital Signs with Ranges  Temp:  [96.9  F (36.1  C)-98.1  F (36.7  C)] 97.4  F (36.3  C)  Pulse:  [60-88] 61  Resp:  [14-20] 16  BP: ()/(58-73) 144/73  SpO2:  [94 %-97 %] 97 %  No intake/output data recorded.    Constitutional: awake, alert, cooperative, no apparent distress, and appears stated age, vitals stable  Respiratory: No increased work of breathing, good air exchange, clear to auscultation bilaterally, no crackles or wheezing  Cardiovascular: Normal apical impulse, regular rate and rhythm, normal S1 and S2, no S3 or S4, and no murmur noted  Skin: trace edema to bilateral lower extremities otherwise, no redness, warmth, and no rashes  Neuropsychiatric: General: less anxious, calm, and normal eye contact    Medications   Current Facility-Administered Medications   Medication Dose Route Frequency Provider Last Rate Last Admin    aspirin EC  tablet 81 mg  81 mg Oral QAM Fatmata Wilcox CNP   81 mg at 11/01/24 0815    docusate sodium (COLACE) capsule 100 mg  100 mg Oral BID Fatmata Wilcox CNP        DULoxetine (CYMBALTA) DR capsule 60 mg  60 mg Oral QAM Brook Rodriguez APRN CNP   60 mg at 11/01/24 0817    empagliflozin (JARDIANCE) tablet 10 mg  10 mg Oral QAM Fatmata Wilcox CNP   10 mg at 11/01/24 0815    fluticasone-vilanterol (BREO ELLIPTA) 100-25 MCG/ACT inhaler 1 puff  1 puff Inhalation QAM Fatmata Wilcox CNP   1 puff at 11/01/24 0819    furosemide (LASIX) tablet 40 mg  40 mg Oral QAM Fatmata Wilcox CNP   40 mg at 11/01/24 0817    gabapentin (NEURONTIN) capsule 400 mg  400 mg Oral TID Gunjan Lucas NP   400 mg at 11/01/24 0815    lisinopril (ZESTRIL) tablet 20 mg  20 mg Oral QAM Fatmata Wilcox CNP   20 mg at 11/01/24 0816    lurasidone (LATUDA) tablet 20 mg  20 mg Oral Daily with supper Brook Rodriguez APRN CNP   20 mg at 10/31/24 1724    metoprolol succinate ER (TOPROL XL) 24 hr tablet 25 mg  25 mg Oral QAM Fatmata Wilcox CNP   25 mg at 10/31/24 0839    multivitamin w/minerals (THERA-VIT-M) tablet 1 tablet  1 tablet Oral QAM Fatmata Wilcox CNP   1 tablet at 11/01/24 0816    polyethylene glycol (MIRALAX) Packet 17 g  17 g Oral Daily Fatmata Wilcox CNP        QUEtiapine (SEROquel) half-tab 12.5 mg  12.5 mg Oral At Bedtime Brook Rodriguez APRN CNP   12.5 mg at 10/31/24 1935    rosuvastatin (CRESTOR) tablet 20 mg  20 mg Oral At Bedtime Fatmata Wilcox CNP   20 mg at 10/31/24 1934    thiamine tablet 100 mg  100 mg Oral QAM Fatmata Wilcox CNP   100 mg at 11/01/24 0816    tolterodine ER (DETROL LA) 24 hr capsule 4 mg  4 mg Oral Fatmata Velasquez, CNP   4 mg at 11/01/24 0816       Data   Recent Labs   Lab 11/01/24  0604 10/31/24  1705 10/31/24  0840 10/29/24  1028   WBC  --   --   --  6.7   HGB  --   --   --  15.1   MCV  --   --   --  91   PLT  --   --   --  251   NA  --   --  139 141   POTASSIUM  --   --  4.4 4.0   CHLORIDE  --   --   103 104   CO2  --   --  22 24   BUN  --   --  26.9* 26.5*   CR  --   --  1.16 1.18*   ANIONGAP  --   --  14 13   SIDDHARTH  --   --  9.5 9.6   * 82 201* 110*   ALBUMIN  --   --   --  4.2   PROTTOTAL  --   --   --  6.7   BILITOTAL  --   --   --  0.3   ALKPHOS  --   --   --  71   ALT  --   --   --  45   AST  --   --   --  26       No results found for this or any previous visit (from the past 24 hours).

## 2024-11-01 NOTE — PROGRESS NOTES
Added Chemical Dependency Referrals, Alcoholics Anonymous Meetings in [place], and Additional Chemical Dependency Treatment Resources to [facility].     Called [facility] to request confirmation referral was received. Left message requesting response.

## 2024-11-01 NOTE — PLAN OF CARE
Problem: Adult Behavioral Health Plan of Care  Goal: Patient-Specific Goal (Individualization)  Description: Patient will sleep 6 to 8 hours per night  Patient will eat at least 50% of meals  Patient will attend at least 50% of groups  Patient will comply with recommendations of treatment team  Patient will remain medication compliant    Outcome: Progressing     Patient is alert, tearful. Able to make needs known. VSS, afebrile. Assessment and vitals as charted.   Patient has been in lounge majority of the shift. Becomes tearful speaking with writer. States he is unsure if he is really ready to discharge. He than tells writer about the long list of stuff he needs to get done. Writer reminded patient to take things one day at at time. Reassurance provided. Did offer and patient accepting on PRN for anxiety. Patient received PRN atarax 25 mg PO x 1 for anxiety. Upon reassessment, patient did verbalize some decrease. He denies SI, SIB, A/VH. Endorses depression.   Steady gait, no falls. Appetite good. Ate 100% of dinner. Requested to have miralax after declining it on previous shift. States he was straining to go this morning.       End of shift report will be given to ANETTE MURPHY.

## 2024-11-02 LAB
GLUCOSE BLDC GLUCOMTR-MCNC: 107 MG/DL (ref 70–99)
GLUCOSE BLDC GLUCOMTR-MCNC: 96 MG/DL (ref 70–99)

## 2024-11-02 PROCEDURE — 250N000013 HC RX MED GY IP 250 OP 250 PS 637: Performed by: NURSE PRACTITIONER

## 2024-11-02 PROCEDURE — 124N000001 HC R&B MH

## 2024-11-02 PROCEDURE — 99232 SBSQ HOSP IP/OBS MODERATE 35: CPT | Performed by: NURSE PRACTITIONER

## 2024-11-02 PROCEDURE — 250N000013 HC RX MED GY IP 250 OP 250 PS 637

## 2024-11-02 RX ORDER — DULOXETIN HYDROCHLORIDE 30 MG/1
60 CAPSULE, DELAYED RELEASE ORAL EVERY MORNING
Qty: 60 CAPSULE | Refills: 1 | Status: SHIPPED | OUTPATIENT
Start: 2024-11-02 | End: 2024-11-04

## 2024-11-02 RX ORDER — QUETIAPINE FUMARATE 25 MG/1
12.5 TABLET, FILM COATED ORAL AT BEDTIME
Qty: 30 TABLET | Refills: 0 | Status: SHIPPED | OUTPATIENT
Start: 2024-11-02

## 2024-11-02 RX ORDER — OLANZAPINE 2.5 MG/1
2.5 TABLET, FILM COATED ORAL DAILY PRN
Qty: 10 TABLET | Refills: 0 | Status: SHIPPED | OUTPATIENT
Start: 2024-11-02 | End: 2024-11-03

## 2024-11-02 RX ADMIN — ROSUVASTATIN CALCIUM 20 MG: 10 TABLET, FILM COATED ORAL at 20:08

## 2024-11-02 RX ADMIN — HYDROXYZINE HYDROCHLORIDE 25 MG: 25 TABLET ORAL at 15:59

## 2024-11-02 RX ADMIN — TOLTERODINE 4 MG: 4 CAPSULE, EXTENDED RELEASE ORAL at 08:12

## 2024-11-02 RX ADMIN — LISINOPRIL 20 MG: 20 TABLET ORAL at 08:10

## 2024-11-02 RX ADMIN — QUETIAPINE FUMARATE 12.5 MG: 25 TABLET ORAL at 20:09

## 2024-11-02 RX ADMIN — GABAPENTIN 400 MG: 400 CAPSULE ORAL at 20:08

## 2024-11-02 RX ADMIN — PANTOPRAZOLE SODIUM 40 MG: 40 TABLET, DELAYED RELEASE ORAL at 20:20

## 2024-11-02 RX ADMIN — EMPAGLIFLOZIN 10 MG: 10 TABLET, FILM COATED ORAL at 08:12

## 2024-11-02 RX ADMIN — GABAPENTIN 400 MG: 400 CAPSULE ORAL at 13:54

## 2024-11-02 RX ADMIN — ASPIRIN 81 MG: 81 TABLET, COATED ORAL at 08:09

## 2024-11-02 RX ADMIN — DULOXETINE HYDROCHLORIDE 60 MG: 30 CAPSULE, DELAYED RELEASE PELLETS ORAL at 08:11

## 2024-11-02 RX ADMIN — DOCUSATE SODIUM 100 MG: 100 CAPSULE, LIQUID FILLED ORAL at 08:12

## 2024-11-02 RX ADMIN — POLYETHYLENE GLYCOL 3350 17 G: 17 POWDER, FOR SOLUTION ORAL at 08:12

## 2024-11-02 RX ADMIN — OLANZAPINE 2.5 MG: 2.5 TABLET, FILM COATED ORAL at 15:03

## 2024-11-02 RX ADMIN — Medication 100 MG: at 08:10

## 2024-11-02 RX ADMIN — FUROSEMIDE 40 MG: 20 TABLET ORAL at 08:10

## 2024-11-02 RX ADMIN — Medication 1 TABLET: at 08:10

## 2024-11-02 RX ADMIN — GABAPENTIN 400 MG: 400 CAPSULE ORAL at 08:11

## 2024-11-02 RX ADMIN — METOPROLOL SUCCINATE 25 MG: 25 TABLET, EXTENDED RELEASE ORAL at 08:11

## 2024-11-02 RX ADMIN — DOCUSATE SODIUM 100 MG: 100 CAPSULE, LIQUID FILLED ORAL at 20:08

## 2024-11-02 RX ADMIN — FLUTICASONE FUROATE AND VILANTEROL TRIFENATATE 1 PUFF: 100; 25 POWDER RESPIRATORY (INHALATION) at 08:12

## 2024-11-02 RX ADMIN — LURASIDONE HYDROCHLORIDE 20 MG: 20 TABLET, COATED ORAL at 17:34

## 2024-11-02 RX ADMIN — OLANZAPINE 2.5 MG: 2.5 TABLET, FILM COATED ORAL at 09:27

## 2024-11-02 ASSESSMENT — ACTIVITIES OF DAILY LIVING (ADL)
ADLS_ACUITY_SCORE: 0
DRESS: SCRUBS (BEHAVIORAL HEALTH)
ADLS_ACUITY_SCORE: 0
HYGIENE/GROOMING: INDEPENDENT
ADLS_ACUITY_SCORE: 0
LAUNDRY: UNABLE TO COMPLETE
ADLS_ACUITY_SCORE: 0
ORAL_HYGIENE: INDEPENDENT
ADLS_ACUITY_SCORE: 0

## 2024-11-02 NOTE — PLAN OF CARE
Face to face end of shift report received from Yaneth CHEEMA. Rounding completed. Patient observed in bed appears asleep.     Problem: Adult Behavioral Health Plan of Care  Goal: Patient-Specific Goal (Individualization)  Description: Patient will sleep 6 to 8 hours per night  Patient will eat at least 50% of meals  Patient will attend at least 50% of groups  Patient will comply with recommendations of treatment team  Patient will remain medication compliant      Outcome: Progressing     Problem: Depression  Goal: Decreased Depression Symptoms  Description: Patient will verbalize a decrease in depression, and increase in mood by discharge.  Outcome: Progressing     Problem: Suicide Risk  Goal: Absence of Self-Harm  Description: Patient will be free of suicidal ideation or intent by discharge.  Outcome: Progressing   Goal Outcome Evaluation:        0604 Pt BG is 107     No observed episodes of SIB this shift. Patient slept (6.25) hours. Face to face end of shift report communicated to oncoming shift.     Nikolai Beckford RN  11/2/2024  0609 AM

## 2024-11-02 NOTE — PROGRESS NOTES
[facility] PSYCHIATRY  PROGRESS NOTE     SUBJECTIVE     Prior to interviewing the patient, I met with nursing and reviewed patient's clinical condition. We discussed clinical care both before and after the interview. I have reviewed the patient's clinical course by review of records including previous notes, labs, and vital signs.     Per nursing, the patient had the following behavioral events over the last 24-hours: none.    On psychiatric interview, patient reports he got up and was very anxious thinking about going home, took olanzapine 2.5 mg and feels much better at this time.  He's attending groups and engaging with peers. Appetite is normal. He slept well. Continues to worry about drinking when he returns home.  Reviewed his schedule, reiterated going to 90 AA meetings in 90 days and was given a schedule to the meetings. He's been to all that are local to him and is connected with them. We also reviewed PCA services available to him which he can call and set up when he returns home. He's reaching out to a potential sponsor today, his  and planning on looking at an apartment in [place] on Wednesday.     Current plan is for discharge on Monday. Meds sent to [facility]. Will continue talking through the discharge plan with him. He has appointments set up for therapy and resources available within [place],         MEDICATIONS   Scheduled Meds:  Current Facility-Administered Medications   Medication Dose Route Frequency Provider Last Rate Last Admin    aspirin EC tablet 81 mg  81 mg Oral QAM Fatmata Wilcox CNP   81 mg at 11/02/24 0809    docusate sodium (COLACE) capsule 100 mg  100 mg Oral BID Fatmata Wilcox CNP   100 mg at 11/02/24 0812    DULoxetine (CYMBALTA) DR capsule 60 mg  60 mg Oral QAM Brook Rodriguez APRN CNP   60 mg at 11/02/24 0811    empagliflozin (JARDIANCE) tablet 10 mg  10 mg Oral QAM Fatmata Wilcox CNP   10 mg at 11/02/24 0812    fluticasone-vilanterol (BREO ELLIPTA) 100-25 MCG/ACT  inhaler 1 puff  1 puff Inhalation QAM Fatmata Wilcox CNP   1 puff at 11/02/24 0812    furosemide (LASIX) tablet 40 mg  40 mg Oral QAM Fatmata Wilcox CNP   40 mg at 11/02/24 0810    gabapentin (NEURONTIN) capsule 400 mg  400 mg Oral TID Gunjan Lucas, NP   400 mg at 11/02/24 0811    lisinopril (ZESTRIL) tablet 20 mg  20 mg Oral QAM Fatmata Wilcox CNP   20 mg at 11/02/24 0810    lurasidone (LATUDA) tablet 20 mg  20 mg Oral Daily with supper Brook Rodriguez APRN CNP   20 mg at 11/01/24 1643    metoprolol succinate ER (TOPROL XL) 24 hr tablet 25 mg  25 mg Oral QAM Fatmata Wilcox CNP   25 mg at 11/02/24 0811    multivitamin w/minerals (THERA-VIT-M) tablet 1 tablet  1 tablet Oral QAM Fatmata Wilcox CNP   1 tablet at 11/02/24 0810    polyethylene glycol (MIRALAX) Packet 17 g  17 g Oral Daily Reder, Fatmata, CNP   17 g at 11/02/24 0812    QUEtiapine (SEROquel) half-tab 12.5 mg  12.5 mg Oral At Bedtime Brook Rodriguez APRN CNP   12.5 mg at 11/01/24 2003    rosuvastatin (CRESTOR) tablet 20 mg  20 mg Oral At Bedtime Fatmata Wilcox CNP   20 mg at 11/01/24 2003    thiamine tablet 100 mg  100 mg Oral QAM Fatmata Wilcox CNP   100 mg at 11/02/24 0810    tolterodine ER (DETROL LA) 24 hr capsule 4 mg  4 mg Oral QAM Fatmata Wilcox CNP   4 mg at 11/02/24 0812     PRN Meds:.  Current Facility-Administered Medications   Medication Dose Route Frequency Provider Last Rate Last Admin    acetaminophen (TYLENOL) tablet 650 mg  650 mg Oral Q4H PRN Isaiah Elizabeth APRN CNP        albuterol (PROVENTIL HFA/VENTOLIN HFA) inhaler  2 puff Inhalation Q4H PRN Fatmata Wilcox CNP   2 puff at 10/25/24 1242    alum & mag hydroxide-simethicone (MAALOX) suspension 30 mL  30 mL Oral Q4H PRN Isaiah Elizabeth APRN CNP        calcium carbonate (TUMS) chewable tablet 500 mg  500 mg Oral Daily PRN Kalani Teran APRN CNP        hydrOXYzine HCl (ATARAX) tablet 25 mg  25 mg Oral 4x Daily PRN Gunjan Lucas NP   25 mg at 11/01/24 1544    magnesium  hydroxide (MILK OF MAGNESIA) suspension 30 mL  30 mL Oral Daily PRN Fatmata Wilcox CNP        melatonin tablet 3 mg  3 mg Oral At Bedtime PRN Isaiah Elizabeth APRN CNP   3 mg at 10/25/24 2131    nicotine (NICORETTE) gum 2 mg  2 mg Buccal Q1H PRN Isaiah Elizabeth APRN CNP        nitroGLYcerin (NITROSTAT) sublingual tablet 0.4 mg  0.4 mg Sublingual Q5 Min PRN Fatmata Wilcox CNP        OLANZapine (zyPREXA) tablet 2.5 mg  2.5 mg Oral TID PRN Isaiah Elizabeth APRN CNP   2.5 mg at 10/30/24 1113    Or    OLANZapine (zyPREXA) injection 2.5 mg  2.5 mg Intramuscular TID PRN Isaiah Elizabeth APRN CNP        pantoprazole (PROTONIX) EC tablet 40 mg  40 mg Oral Daily PRN Fatmata Wilcox CNP            ALLERGIES   No Known Allergies     MENTAL STATUS EXAM   Vitals: /73   Pulse 85   Temp 97.4  F (36.3  C) (Temporal)   Resp 18   Wt 110 kg (242 lb 6.4 oz)   SpO2 96%   BMI 32.88 kg/m      Appearance:  alert, oriented  Attitude:  resistant  Eye Contact:  good  Mood:  depressed  Affect:  mood congruent  Speech:  clear, coherent and slight SOB noted with speaking  Psychomotor Behavior:  no evidence of tardive dyskinesia, dystonia, or tics  Thought Process:  logical and linear  Associations:  no loose associations  Thought Content:  no evidence of psychotic thought and passive suicidal ideation present - no plan or intent  Insight:  limited  Judgment:  limited  Oriented to:  time, person, and place  Attention Span and Concentration:  fair  Recent and Remote Memory:  limited/fair  Fund of Knowledge: appropriate  Muscle Strength and Tone: normal  Gait and Station: Normal       LABS   Recent Results (from the past 24 hours)   Glucose by meter    Collection Time: 11/01/24  8:00 PM   Result Value Ref Range    GLUCOSE BY METER POCT 111 (H) 70 - 99 mg/dL   Glucose by meter    Collection Time: 11/02/24  6:03 AM   Result Value Ref Range    GLUCOSE BY METER POCT 107 (H) 70 - 99 mg/dL           IMPRESSION   This is a 74 year old male with a PMH  "of MDD, CHELSIE, and alcohol abuse who presents to the ED for evaluation of anxiety and depression. Patient reported taking three tablets of his Ativan this morning in an attempt to help his anxiety, though started to feel tired and weak. Tried to get into [facility] though they were full. Today patient denies any SI. He reports feeling more depressed and anxiety over past few weeks, more so the past few days. He notes that he feels very isolated and lonely since retiring from work as a . He has no family or children. He reports feeling he has \"no purpose\". Patient notes that he is interested in increasing his mental health services and looking into volunteer options. He has longer term plan of getting into an assisted living. Is in agreement to continue Cymbalta, Melatonin, Gabapentin. He does not want to continue Ativan and is aware of the risks in taking this medication given his age and his ongoing alcohol use. He was not taking Naltrexone at home, though would be interested in restarting this prior to discharge. Patient is in agreement to voluntary admission for stabilization. He notes that he may be interested in stepping down from the hospital to [facility] prior to return home, can look into this closer to discharge.     Today: Tolerating medication changes without SE. No SI. Fearful of \"going home and not doing anything\". Stressed importance of self care, reviewed his upcoming schedule and plans. Will continue with brief cognitive therapy and encourage Jeffrey to apply his knowledge and resources into his actions. If he is unable to do this, would consider assisted living facility, however currently he is physically and cognitively healthy enough to live independently.      DIAGNOSES     #.Major depressive disorder, recurrent, severe  #. Alcohol use disorder, severe  #. Generalized anxiety disorder       PLAN     Location: Unit 5  Legal Status: Orders Placed This Encounter      Voluntary    Safety " Assessment:    Behavioral Orders   Procedures    Code 1 - Restrict to Unit    Routine Programming     As clinically indicated    Status 15     Every 15 minutes.    Suicide precautions: Suicide Risk: LOW; Clinical rationale to override score: modification to the care environment     Consider searching patient belongings according to policy for contraband or items that could be used for self-harm.     Order Specific Question:   Suicide Risk     Answer:   LOW     Order Specific Question:   Clinical rationale to override score:     Answer:   modification to the care environment      PTA psychotropic medications stopped:     - Ativan    PTA psychotropic medications continued/changed:     - duloxetine 90 mg qAM -> reduced to 60 mg 10/29    New medications tried and stopped:   Valium 5 mg BID X 2 doses.    New medications initiated:   Seroquel 25 mg at hs -> reduced to 12.5 d/t elevated BG  Latuda 20 mg with supper -10/29    Today's Changes:   - Seroquel reduced to 12.5 d/t elevated BG    Programming: Patient will be treated in a therapeutic milieu with appropriate individual and group therapies. Education will be provided on diagnoses, medications, and treatments.     Medical diagnoses:  Per medicine    Consult: CD evaluation, OT evaluation - cognitive  Tests: None    Anticipated LOS: 5-7 days  Disposition: home with services.       TREATMENT TEAM CARE PLAN     Progress: Continued symptoms.    Continued Stay Criteria/Rationale: Continued symptoms without sufficient improvement/resolution.    Medical/Physical: See above.    Precautions: See above.     Plan: Continue inpatient care with unit support and medication management.    Rationale for change in precautions or plan: NA due to no change.    Participants: YOSSI Callejas, MICK, FNP-C    The patient's care was discussed with the treatment team and chart notes were reviewed.       ATTESTATION      YOSSI Callejas, MICK, FNP-C

## 2024-11-02 NOTE — PLAN OF CARE
Face to face shift report received from Nikolai CHEEMA. Rounding completed, pt observed in lounge at the start of the shift.    Patient in and out of room throughout the day. Alert and making needs known. Eating well for meals. Pleasant upon interactions with this writer. Tearful on and off during conversation today. Endorses high anxiety while laying in bed. Requested and received Zyprexa 2.5 mg at 0927. States the hydroxyzine hasn't been working. Worried about discharging on Monday stating he is unsure if he is ready. Discussed resources he is already connected with at home. Able to rest after taking medication. Denies hallucinations and SI/HI. Abrasion on second toe on left foot healing. Denies pain. Steady and balanced gait. No falls. Frequent rounding.    Problem: Adult Behavioral Health Plan of Care  Goal: Patient-Specific Goal (Individualization)  Description: Patient will sleep 6 to 8 hours per night  Patient will eat at least 50% of meals  Patient will attend at least 50% of groups  Patient will comply with recommendations of treatment team  Patient will remain medication compliant        Outcome: Progressing     Problem: Depression  Goal: Decreased Depression Symptoms  Description: Patient will verbalize a decrease in depression, and increase in mood by discharge.  Outcome: Progressing     Problem: Suicide Risk  Goal: Absence of Self-Harm  Description: Patient will be free of suicidal ideation or intent by discharge.  Outcome: Progressing    Face to face report will be communicated to oncoming ANETTE.    Estella Serrano RN  11/2/2024

## 2024-11-02 NOTE — PLAN OF CARE
Problem: Adult Behavioral Health Plan of Care  Goal: Patient-Specific Goal (Individualization)  Description: Patient will sleep 6 to 8 hours per night  Patient will eat at least 50% of meals  Patient will attend at least 50% of groups  Patient will comply with recommendations of treatment team  Patient will remain medication compliant  Outcome: Progressing     Problem: Depression  Goal: Decreased Depression Symptoms  Description: Patient will verbalize a decrease in depression, and increase in mood by discharge.  Outcome: Progressing     Problem: Suicide Risk  Goal: Absence of Self-Harm  Description: Patient will be free of suicidal ideation or intent by discharge.  Outcome: Progressing    Face to face shift report received from previously assigned nurse. Rounding completed, pt observed sitting in the lounge, watching television.    Patient is met with in the lounge. Denies pain, SI, HI, A/V hallucinations, and depression. Endorses anxiety, patient is sure that he did not receive the zyprexa at shift change as he was supposed to. After looking into, patient did receive PRN zyprexa at shift change, just did not remember getting it. PRN hydroxyzine administered for continued anxiety. Patient is appropriate with staff and peers. No further needs at this time.     BG 96 prior to dinner. No further needs at this time.     Compliant with HS medication administration. Requested and received PRN Protonix for complaints of heartburn. No further needs at this time.     Face to face report communicated to oncjody CHEEMA.    Harriet Huff RN  11/2/2024  5:01 PM

## 2024-11-03 LAB
GLUCOSE BLDC GLUCOMTR-MCNC: 121 MG/DL (ref 70–99)
GLUCOSE BLDC GLUCOMTR-MCNC: 148 MG/DL (ref 70–99)

## 2024-11-03 PROCEDURE — 250N000013 HC RX MED GY IP 250 OP 250 PS 637: Performed by: NURSE PRACTITIONER

## 2024-11-03 PROCEDURE — 250N000013 HC RX MED GY IP 250 OP 250 PS 637

## 2024-11-03 PROCEDURE — 124N000001 HC R&B MH

## 2024-11-03 RX ORDER — LURASIDONE HYDROCHLORIDE 20 MG/1
20 TABLET, FILM COATED ORAL
Qty: 30 TABLET | Refills: 0 | Status: SHIPPED | OUTPATIENT
Start: 2024-11-03

## 2024-11-03 RX ADMIN — LISINOPRIL 20 MG: 20 TABLET ORAL at 08:17

## 2024-11-03 RX ADMIN — QUETIAPINE FUMARATE 12.5 MG: 25 TABLET ORAL at 20:38

## 2024-11-03 RX ADMIN — GABAPENTIN 400 MG: 400 CAPSULE ORAL at 20:39

## 2024-11-03 RX ADMIN — Medication 1 TABLET: at 08:17

## 2024-11-03 RX ADMIN — METOPROLOL SUCCINATE 25 MG: 25 TABLET, EXTENDED RELEASE ORAL at 08:16

## 2024-11-03 RX ADMIN — DULOXETINE HYDROCHLORIDE 60 MG: 30 CAPSULE, DELAYED RELEASE PELLETS ORAL at 08:18

## 2024-11-03 RX ADMIN — MELATONIN 3 MG: at 22:57

## 2024-11-03 RX ADMIN — ASPIRIN 81 MG: 81 TABLET, COATED ORAL at 08:18

## 2024-11-03 RX ADMIN — FLUTICASONE FUROATE AND VILANTEROL TRIFENATATE 1 PUFF: 100; 25 POWDER RESPIRATORY (INHALATION) at 08:19

## 2024-11-03 RX ADMIN — LURASIDONE HYDROCHLORIDE 20 MG: 20 TABLET, COATED ORAL at 17:05

## 2024-11-03 RX ADMIN — EMPAGLIFLOZIN 10 MG: 10 TABLET, FILM COATED ORAL at 08:18

## 2024-11-03 RX ADMIN — TOLTERODINE 4 MG: 4 CAPSULE, EXTENDED RELEASE ORAL at 08:16

## 2024-11-03 RX ADMIN — POLYETHYLENE GLYCOL 3350 17 G: 17 POWDER, FOR SOLUTION ORAL at 08:19

## 2024-11-03 RX ADMIN — GABAPENTIN 400 MG: 400 CAPSULE ORAL at 08:16

## 2024-11-03 RX ADMIN — FUROSEMIDE 40 MG: 20 TABLET ORAL at 08:17

## 2024-11-03 RX ADMIN — ROSUVASTATIN CALCIUM 20 MG: 10 TABLET, FILM COATED ORAL at 20:39

## 2024-11-03 RX ADMIN — DOCUSATE SODIUM 100 MG: 100 CAPSULE, LIQUID FILLED ORAL at 20:39

## 2024-11-03 RX ADMIN — GABAPENTIN 400 MG: 400 CAPSULE ORAL at 14:16

## 2024-11-03 RX ADMIN — Medication 100 MG: at 08:15

## 2024-11-03 RX ADMIN — DOCUSATE SODIUM 100 MG: 100 CAPSULE, LIQUID FILLED ORAL at 08:18

## 2024-11-03 RX ADMIN — HYDROXYZINE HYDROCHLORIDE 25 MG: 25 TABLET ORAL at 22:57

## 2024-11-03 ASSESSMENT — ACTIVITIES OF DAILY LIVING (ADL)
ADLS_ACUITY_SCORE: 0
DRESS: SCRUBS (BEHAVIORAL HEALTH);INDEPENDENT
ADLS_ACUITY_SCORE: 0
ORAL_HYGIENE: INDEPENDENT
ADLS_ACUITY_SCORE: 0
LAUNDRY: UNABLE TO COMPLETE
ADLS_ACUITY_SCORE: 0
HYGIENE/GROOMING: INDEPENDENT
ADLS_ACUITY_SCORE: 0

## 2024-11-03 NOTE — DISCHARGE SUMMARY
"[facility] PSYCHIATRY  DISCHARGE SUMMARY     DISCHARGE DATA     Jeffrey King MRN# [id number]   Age: 74 year old YOB: 1950     Date of Admission: 10/24/2024  Date of Discharge: November 5, 2024  Discharge Provider: YOSSI Pandey CNP       REASON FOR ADMISSION     This is a 74 year old male with a PMH of MDD, CHELSIE, and alcohol abuse who presents to the ED for evaluation of anxiety and depression. Patient reported taking three tablets of his Ativan this morning in an attempt to help his anxiety, though started to feel tired and weak. Tried to get into [facility] though they were full. Today patient denies any SI. He reports feeling more depressed and anxiety over past few weeks, more so the past few days. He notes that he feels very isolated and lonely since retiring from work as a . He has no family or children. He reports feeling he has \"no purpose\". Patient notes that he is interested in increasing his mental health services and looking into volunteer options. He has longer term plan of getting into an assisted living. Is in agreement to continue Cymbalta, Melatonin, Gabapentin. He does not want to continue Ativan and is aware of the risks in taking this medication given his age and his ongoing alcohol use. He was not taking Naltrexone at home, though would be interested in restarting this prior to discharge. Patient is in agreement to voluntary admission for stabilization. He notes that he may be interested in stepping down from the hospital to [facility] prior to return home, can look into this closer to discharge.        DISCHARGE DIAGNOSES     #. Major depressive disorder, recurrent, severe  #. Generalized anxiety disorder  #. Alcohol use disorder, severe       CONSULTS     OT consult  CD evaluation       HOSPITAL COURSE   Psychiatric Course:    Legal status: Orders Placed This Encounter      Voluntary    Patient was admitted to unit 5 due to the aforementioned " presentation. The patient was placed under 15 minute checks to ensure patient safety. The patient participated in unit programming and groups as able.    Mr. King did not require seclusion/restraint during hospitalization.     We reviewed with Mr. King current and past medication trials including duration, dose, response and side effects. During this hospitalization, the following changes to the patient's psychotropic medications were made:    Low dose seroquel 25 mg was added on to sleep due to his history of alcohol abuse and the difficulty that arises when alcohol is used for sleep and he responded well to this. Cymbalta was reduced and Latuda 20 mg was added on to address his depression. He did experience a day of an elevated blood sugar and seroquel was reduced to 12.5 mg at bed time and he was still able to get adequate sleep. His CD evaluation recommended residential treatment and referrals are pending. He was given a fair amount of resources of which he is all familiar with and has used in the past to maintain sobriety at home as well as information to set up PCA services in his home. Patient has the resources needed to address his needs and understands what needs to be done, however struggled with executing tasks and making appropriate decisions tending to want others to do tasks for him. Conducted brief, supportive therapy with improvement in symptoms.    With these changes and supports the patient noticed improvement in their symptoms and felt sufficiently ready for discharge. As a result, Jeffrey King was discharged. At the time of discharge, Jeffrey King was determined to not be a danger to self or others. At the current time of discharge, the patient does not meet criteria for involuntary hospitalization. On the day of discharge, the patient reports that they do not have suicidal or homicidal ideation. Steps taken to minimize risk include: assessing patient s behavior and  thought process daily during hospital stay, discharging patient with adequate plan for follow up for mental and physical health and discussing safety plan of returning to the hospital should the patient ever have thoughts of harming themselves or others. Therefore, based on all available evidence including the factors cited above, the patient does not appear to be at imminent risk for self-harm, and is appropriate for outpatient level of care. However, if patient uses substances or is medication non-adherent, their risk of decompensation and SI will be elevated. This was discussed with the patient.    Medical Course:    The patient was medically cleared for admission to inpatient psychiatry. The patient was medically stable at the time of discharge.        DISCHARGE MEDICATIONS     Current Discharge Medication List        START taking these medications    Details   lurasidone (LATUDA) 20 MG TABS tablet Take 1 tablet (20 mg) by mouth daily (with dinner).  Qty: 30 tablet, Refills: 0    Associated Diagnoses: Moderate episode of recurrent major depressive disorder (H)      QUEtiapine (SEROQUEL) 25 MG tablet Take 0.5 tablets (12.5 mg) by mouth at bedtime.  Qty: 30 tablet, Refills: 0    Associated Diagnoses: Moderate episode of recurrent major depressive disorder (H)           CONTINUE these medications which have CHANGED    Details   DULoxetine (CYMBALTA) 30 MG capsule Take 2 capsules (60 mg) by mouth every morning. Do not crush.  Qty: 60 capsule, Refills: 1    Associated Diagnoses: Moderate episode of recurrent major depressive disorder (H)           CONTINUE these medications which have NOT CHANGED    Details   aspirin 81 MG EC tablet Take 81 mg by mouth every morning.      Docusate Calcium (STOOL SOFTENER PO) Take 1 capsule by mouth daily.      empagliflozin (JARDIANCE) 10 MG TABS tablet Take 10 mg by mouth every morning. Do not chew or crush.      fluticasone-vilanterol (BREO ELLIPTA) 100-25 MCG/ACT inhaler Inhale 1  puff into the lungs every morning. Rinse mouth after use.      furosemide (LASIX) 40 MG tablet Take 1 tablet by mouth every morning.      gabapentin (NEURONTIN) 400 MG capsule Take 1 capsule by mouth 3 times daily.      hydrOXYzine HCl (ATARAX) 50 MG tablet Take 50 mg by mouth 4 times daily as needed for anxiety.      lisinopril (ZESTRIL) 20 MG tablet Take 1 tablet by mouth every morning.      melatonin 3 MG tablet Take 3 mg by mouth at bedtime.      METOPROLOL SUCCINATE ER PO Take 1 tablet by mouth every morning. Do not crush or chew.      multivitamin, therapeutic (THERA-VIT) TABS tablet Take 1 tablet by mouth every morning.      nitroGLYcerin (NITROSTAT) 0.4 MG sublingual tablet Place 0.4 mg under the tongue every 5 minutes as needed for chest pain. Do not crush; maximum of 3 doses in 15 minutes.      OMEGA 3-6-9 FATTY ACIDS PO Take 2 capsules by mouth 2 times daily.      omeprazole (PRILOSEC) 20 MG DR capsule Take 20 mg by mouth daily as needed (heartburn). Take before meals. Do not crush.      ROSUVASTATIN CALCIUM PO Take 1 tablet by mouth at bedtime.      thiamine (B-1) 100 MG tablet Take 100 mg by mouth every morning.      tolterodine ER (DETROL LA) 4 MG 24 hr capsule Take 4 mg by mouth every morning.      Vitamin D3 (VITAMIN D, CHOLECALCIFEROL,) 25 mcg (1000 units) tablet Take 25 mcg by mouth daily.      naltrexone (DEPADE/REVIA) 50 MG tablet Take 50 mg by mouth daily.      senna-docusate (SENOKOT-S/PERICOLACE) 8.6-50 MG tablet Take 2 tablets by mouth 2 times daily as needed for constipation.           STOP taking these medications       LORazepam (ATIVAN) 0.5 MG tablet Comments:   Reason for Stopping:             Reason for two or more neuroleptics: Use of one neuroleptic was not adequate in managing symptoms.        MENTAL STATUS EXAM   Vitals: /59   Pulse 72   Temp (!) 96.3  F (35.7  C) (Temporal)   Resp 19   Wt 110 kg (242 lb 6.4 oz)   SpO2 98%   BMI 32.88 kg/m      Appearance: Alert,  "oriented, dressed in hospital scrubs, appears stated age   Attitude: Cooperative   Eye Contact: Good  Mood: \"Better\"  Affect: Full range of affect  Speech: Normal rate and rhythm   Psychomotor Behavior: No tremor, rigidity, or psychomotor abnormality   Thought Process: Logical, goal directed   Associations: No loose associations   Thought Content: Denies SI or plan. No SIB. Denies A/V hallucinations. No evidence of delusional thought.  Insight: Fair  Judgment: Fair  Oriented to: Person, place, and time  Attention Span and Concentration: Intact  Recent and Remote Memory: Intact  Language: English with appropriate syntax and vocabulary  Fund of Knowledge: Average  Muscle Strength and Tone: Grossly normal  Gait and Station: Grossly normal       DISCHARGE PLAN     1.  Education given regarding diagnostic and treatment options with risks, benefits and alternatives with adequate verbalization of understanding.  2.  Discharge to home. Upon detailed review of risk factors, patient amenable for release.   3.  Continue aforementioned medications and associated medication changes with follow-up by outpatient provider.  4.  Crisis management planning in place.    5.  Nursing and  to review further discharge recommendations.   6.  Patient is being discharged with the following appointments:    [facility]   [address] [place] [address]   [phone]   Flora Hernández- November 5th @ 11:00am.     [facility]   [address] [place] [address]   [phone]   Psychiatry: Aleisha Mohan- November 26th @ 1:00pm     [facility]   [address] [place] [address]   [phone]   PCP: Dr. Veliz- November 8th @ 9:25am          DISCHARGE SERVICES PROVIDED     40 minutes spent on discharge services, including:  Final examination of patient.  Review and discussion of hospital stay.  Instructions for continued outpatient care/goals.  Preparation of discharge " records.  Preparation of medications refills and new prescriptions.  Preparation of applicable referral forms.        ATTESTATION     Brook Rodriguez, APRN, PMHNP-BC, FNP-C       LABS THIS ADMISSION     Results for orders placed or performed during the hospital encounter of 10/24/24   XR Chest 2 Views     Status: None    Narrative    PROCEDURE:  XR CHEST 2 VIEWS    HISTORY:  sob.     COMPARISON:  None.    FINDINGS:   The cardiac silhouette is normal in size. There is irregular  distribution of pulmonary vascularity suspicious for emphysema. There  is some linear opacities in both lungs most likely representing  scarring. Postoperative changes are seen in the mediastinum. No  pleural effusion or pneumothorax.      Impression    IMPRESSION: Linear atelectasis or scarring in both lungs.    Possible emphysema.    GITA LEWIS MD         SYSTEM ID:  [id number]   INR     Status: Normal   Result Value Ref Range    INR 1.02 0.85 - 1.15   Comprehensive metabolic panel     Status: Abnormal   Result Value Ref Range    Sodium 136 135 - 145 mmol/L    Potassium 4.0 3.4 - 5.3 mmol/L    Carbon Dioxide (CO2) 21 (L) 22 - 29 mmol/L    Anion Gap 14 7 - 15 mmol/L    Urea Nitrogen 18.4 8.0 - 23.0 mg/dL    Creatinine 1.12 0.67 - 1.17 mg/dL    GFR Estimate 69 >60 mL/min/1.73m2    Calcium 9.3 8.8 - 10.4 mg/dL    Chloride 101 98 - 107 mmol/L    Glucose 111 (H) 70 - 99 mg/dL    Alkaline Phosphatase 72 40 - 150 U/L    AST 38 0 - 45 U/L    ALT 72 (H) 0 - 70 U/L    Protein Total 6.8 6.4 - 8.3 g/dL    Albumin 4.3 3.5 - 5.2 g/dL    Bilirubin Total 0.5 <=1.2 mg/dL   Lipase     Status: Normal   Result Value Ref Range    Lipase 25 13 - 60 U/L   Magnesium     Status: Normal   Result Value Ref Range    Magnesium 2.3 1.7 - 2.3 mg/dL   TSH with free T4 reflex     Status: Normal   Result Value Ref Range    TSH 3.08 0.30 - 4.20 uIU/mL   Ethyl Alcohol Level     Status: Normal   Result Value Ref Range    Alcohol ethyl <0.01 <=0.01 g/dL   UA with  Microscopic reflex to Culture     Status: Abnormal    Specimen: Urine, Midstream   Result Value Ref Range    Color Urine Light Yellow Colorless, Straw, Light Yellow, Yellow    Appearance Urine Clear Clear    Glucose Urine 1000 (A) Negative mg/dL    Bilirubin Urine Negative Negative    Ketones Urine Negative Negative mg/dL    Specific Gravity Urine 1.020 1.003 - 1.035    Blood Urine Negative Negative    pH Urine 7.0 4.7 - 8.0    Protein Albumin Urine Negative Negative mg/dL    Urobilinogen Urine Normal Normal, 2.0 mg/dL    Nitrite Urine Negative Negative    Leukocyte Esterase Urine Negative Negative    Mucus Urine Present (A) None Seen /LPF    RBC Urine <1 <=2 /HPF    WBC Urine 3 <=5 /HPF    Squamous Epithelials Urine 0 <=1 /HPF    Narrative    Urine Culture not indicated   Bilirubin direct     Status: Normal   Result Value Ref Range    Bilirubin Direct <0.20 0.00 - 0.30 mg/dL   CBC with platelets and differential     Status: None   Result Value Ref Range    WBC Count 5.8 4.0 - 11.0 10e3/uL    RBC Count 4.73 4.40 - 5.90 10e6/uL    Hemoglobin 14.5 13.3 - 17.7 g/dL    Hematocrit 42.4 40.0 - 53.0 %    MCV 90 78 - 100 fL    MCH 30.7 26.5 - 33.0 pg    MCHC 34.2 31.5 - 36.5 g/dL    RDW 13.2 10.0 - 15.0 %    Platelet Count 233 150 - 450 10e3/uL    % Neutrophils 59 %    % Lymphocytes 19 %    % Monocytes 19 %    % Eosinophils 2 %    % Basophils 0 %    % Immature Granulocytes 0 %    NRBCs per 100 WBC 0 <1 /100    Absolute Neutrophils 3.5 1.6 - 8.3 10e3/uL    Absolute Lymphocytes 1.1 0.8 - 5.3 10e3/uL    Absolute Monocytes 1.1 0.0 - 1.3 10e3/uL    Absolute Eosinophils 0.1 0.0 - 0.7 10e3/uL    Absolute Basophils 0.0 0.0 - 0.2 10e3/uL    Absolute Immature Granulocytes 0.0 <=0.4 10e3/uL    Absolute NRBCs 0.0 10e3/uL   Extra Tube     Status: None    Narrative    The following orders were created for panel order Extra Tube.  Procedure                               Abnormality         Status                     ---------                                -----------         ------                     Extra Red Top Tube[[id number]]                               Final result                 Please view results for these tests on the individual orders.   Extra Red Top Tube     Status: None   Result Value Ref Range    Hold Specimen [facility]    Troponin T, High Sensitivity     Status: Normal   Result Value Ref Range    Troponin T, High Sensitivity 21 <=22 ng/L   Urine Drug Screen Panel     Status: Abnormal   Result Value Ref Range    Amphetamines Urine Screen Negative Screen Negative    Barbituates Urine Screen Negative Screen Negative    Benzodiazepine Urine Screen Positive (A) Screen Negative    Cannabinoids Urine Screen Negative Screen Negative    Cocaine Urine Screen Negative Screen Negative    Fentanyl Qual Urine Screen Negative Screen Negative    Opiates Urine Screen Negative Screen Negative    PCP Urine Screen Negative Screen Negative   Comprehensive metabolic panel     Status: Abnormal   Result Value Ref Range    Sodium 141 135 - 145 mmol/L    Potassium 4.0 3.4 - 5.3 mmol/L    Carbon Dioxide (CO2) 24 22 - 29 mmol/L    Anion Gap 13 7 - 15 mmol/L    Urea Nitrogen 26.5 (H) 8.0 - 23.0 mg/dL    Creatinine 1.18 (H) 0.67 - 1.17 mg/dL    GFR Estimate 65 >60 mL/min/1.73m2    Calcium 9.6 8.8 - 10.4 mg/dL    Chloride 104 98 - 107 mmol/L    Glucose 110 (H) 70 - 99 mg/dL    Alkaline Phosphatase 71 40 - 150 U/L    AST 26 0 - 45 U/L    ALT 45 0 - 70 U/L    Protein Total 6.7 6.4 - 8.3 g/dL    Albumin 4.2 3.5 - 5.2 g/dL    Bilirubin Total 0.3 <=1.2 mg/dL   Nt probnp inpatient     Status: Normal   Result Value Ref Range    N terminal Pro BNP Inpatient 39 0 - 900 pg/mL   CBC with platelets and differential     Status: None   Result Value Ref Range    WBC Count 6.7 4.0 - 11.0 10e3/uL    RBC Count 4.94 4.40 - 5.90 10e6/uL    Hemoglobin 15.1 13.3 - 17.7 g/dL    Hematocrit 44.8 40.0 - 53.0 %    MCV 91 78 - 100 fL    MCH 30.6 26.5 - 33.0 pg    MCHC 33.7 31.5 - 36.5 g/dL     RDW 13.7 10.0 - 15.0 %    Platelet Count 251 150 - 450 10e3/uL    % Neutrophils 73 %    % Lymphocytes 14 %    % Monocytes 10 %    % Eosinophils 2 %    % Basophils 1 %    % Immature Granulocytes 0 %    NRBCs per 100 WBC 0 <1 /100    Absolute Neutrophils 4.9 1.6 - 8.3 10e3/uL    Absolute Lymphocytes 0.9 0.8 - 5.3 10e3/uL    Absolute Monocytes 0.7 0.0 - 1.3 10e3/uL    Absolute Eosinophils 0.1 0.0 - 0.7 10e3/uL    Absolute Basophils 0.1 0.0 - 0.2 10e3/uL    Absolute Immature Granulocytes 0.0 <=0.4 10e3/uL    Absolute NRBCs 0.0 10e3/uL   Extra Tube     Status: None    Narrative    The following orders were created for panel order Extra Tube.  Procedure                               Abnormality         Status                     ---------                               -----------         ------                     Extra Red Top Tube[[id number]]                               Final result                 Please view results for these tests on the individual orders.   Extra Red Top Tube     Status: None   Result Value Ref Range    Hold Specimen [facility]    Basic metabolic panel     Status: Abnormal   Result Value Ref Range    Sodium 139 135 - 145 mmol/L    Potassium 4.4 3.4 - 5.3 mmol/L    Chloride 103 98 - 107 mmol/L    Carbon Dioxide (CO2) 22 22 - 29 mmol/L    Anion Gap 14 7 - 15 mmol/L    Urea Nitrogen 26.9 (H) 8.0 - 23.0 mg/dL    Creatinine 1.16 0.67 - 1.17 mg/dL    GFR Estimate 67 >60 mL/min/1.73m2    Calcium 9.5 8.8 - 10.4 mg/dL    Glucose 201 (H) 70 - 99 mg/dL   Hemoglobin A1c     Status: Abnormal   Result Value Ref Range    Estimated Average Glucose 128 (H) <117 mg/dL    Hemoglobin A1C 6.1 (H) <5.7 %   Glucose by meter     Status: Normal   Result Value Ref Range    GLUCOSE BY METER POCT 82 70 - 99 mg/dL   Glucose by meter     Status: Abnormal   Result Value Ref Range    GLUCOSE BY METER POCT 111 (H) 70 - 99 mg/dL   Glucose by meter     Status: Abnormal   Result Value Ref Range    GLUCOSE BY METER POCT 111 (H) 70 - 99 mg/dL    Glucose by meter     Status: Abnormal   Result Value Ref Range    GLUCOSE BY METER POCT 107 (H) 70 - 99 mg/dL   Glucose by meter     Status: Normal   Result Value Ref Range    GLUCOSE BY METER POCT 96 70 - 99 mg/dL   Glucose by meter     Status: Abnormal   Result Value Ref Range    GLUCOSE BY METER POCT 121 (H) 70 - 99 mg/dL   EKG 12-lead, tracing only     Status: None   Result Value Ref Range    Systolic Blood Pressure  mmHg    Diastolic Blood Pressure  mmHg    Ventricular Rate 67 BPM    Atrial Rate 67 BPM    NY Interval 162 ms    QRS Duration 108 ms     ms    QTc 435 ms    P Axis 63 degrees    R AXIS 66 degrees    T Axis 72 degrees    Interpretation ECG       Sinus rhythm  Incomplete right bundle branch block  Nonspecific T wave abnormality  Abnormal ECG  No previous ECGs available  Confirmed by MD Rivas Anthony ([id number]) on 10/25/2024 7:09:59 AM     CBC with platelets differential     Status: None    Narrative    The following orders were created for panel order CBC with platelets differential.  Procedure                               Abnormality         Status                     ---------                               -----------         ------                     CBC with platelets and d...[[id number]]                      Final result                 Please view results for these tests on the individual orders.   Urine Drug Screen     Status: Abnormal    Narrative    The following orders were created for panel order Urine Drug Screen.  Procedure                               Abnormality         Status                     ---------                               -----------         ------                     Urine Drug Screen Panel[[id number]]      Abnormal            Final result                 Please view results for these tests on the individual orders.   CBC with platelets differential     Status: None    Narrative    The following orders were created for panel order CBC with platelets  differential.  Procedure                               Abnormality         Status                     ---------                               -----------         ------                     CBC with platelets and d...[[id number]]                      Final result                 Please view results for these tests on the individual orders.

## 2024-11-03 NOTE — PLAN OF CARE
Face to face shift report received from Nikolai CHEEMA. Rounding completed, pt observed sleeping at the start of the shift.    Patient in lounge throughout the day. Watching television and attending group. Alert and making needs known. Eating well for meals. Pleasant during conversation with this writer. Compliant with scheduled medication and nursing assessment. States his mood is up and down but overall feeling better today. Denies hallucinations, SI/HI, and pain. Resting in bed this afternoon. States he plans to shave and shower at some point today. Steady and balanced gait. No falls. Frequent rounding.     Problem: Adult Behavioral Health Plan of Care  Goal: Patient-Specific Goal (Individualization)  Description: Patient will sleep 6 to 8 hours per night  Patient will eat at least 50% of meals  Patient will attend at least 50% of groups  Patient will comply with recommendations of treatment team  Patient will remain medication compliant        Outcome: Progressing     Problem: Depression  Goal: Decreased Depression Symptoms  Description: Patient will verbalize a decrease in depression, and increase in mood by discharge.  Outcome: Progressing     Problem: Suicide Risk  Goal: Absence of Self-Harm  Description: Patient will be free of suicidal ideation or intent by discharge.  Outcome: Progressing    Face to face report will be communicated to oncoming RN.    Estella Serrano RN  11/3/2024

## 2024-11-03 NOTE — PLAN OF CARE
Face to face end of shift report received from Harriet CHEEMA. Rounding completed. Patient observed in bed appears asleep.     Problem: Adult Behavioral Health Plan of Care  Goal: Patient-Specific Goal (Individualization)  Description: Patient will sleep 6 to 8 hours per night  Patient will eat at least 50% of meals  Patient will attend at least 50% of groups  Patient will comply with recommendations of treatment team  Patient will remain medication compliant      Outcome: Progressing     Problem: Depression  Goal: Decreased Depression Symptoms  Description: Patient will verbalize a decrease in depression, and increase in mood by discharge.  Outcome: Progressing     Problem: Suicide Risk  Goal: Absence of Self-Harm  Description: Patient will be free of suicidal ideation or intent by discharge.  Outcome: Progressing   Goal Outcome Evaluation:         Patient's BG is 121 at 0607.    No observed episodes of SIB this shift. Patient slept (7.75) hours. Face to face end of shift report communicated to oncoming shift..     Nikolai Beckford RN  11/3/2024  0600 AM

## 2024-11-04 LAB
GLUCOSE BLDC GLUCOMTR-MCNC: 110 MG/DL (ref 70–99)
GLUCOSE BLDC GLUCOMTR-MCNC: 120 MG/DL (ref 70–99)

## 2024-11-04 PROCEDURE — 250N000013 HC RX MED GY IP 250 OP 250 PS 637: Performed by: NURSE PRACTITIONER

## 2024-11-04 PROCEDURE — 99239 HOSP IP/OBS DSCHRG MGMT >30: CPT | Performed by: NURSE PRACTITIONER

## 2024-11-04 PROCEDURE — 124N000001 HC R&B MH

## 2024-11-04 RX ORDER — DULOXETIN HYDROCHLORIDE 60 MG/1
60 CAPSULE, DELAYED RELEASE ORAL EVERY MORNING
Qty: 30 CAPSULE | Refills: 1 | Status: SHIPPED | OUTPATIENT
Start: 2024-11-05

## 2024-11-04 RX ORDER — METOPROLOL SUCCINATE 25 MG/1
25 TABLET, EXTENDED RELEASE ORAL EVERY MORNING
COMMUNITY
Start: 2024-11-04

## 2024-11-04 RX ADMIN — ROSUVASTATIN CALCIUM 20 MG: 10 TABLET, FILM COATED ORAL at 20:08

## 2024-11-04 RX ADMIN — FLUTICASONE FUROATE AND VILANTEROL TRIFENATATE 1 PUFF: 100; 25 POWDER RESPIRATORY (INHALATION) at 08:28

## 2024-11-04 RX ADMIN — ASPIRIN 81 MG: 81 TABLET, COATED ORAL at 08:24

## 2024-11-04 RX ADMIN — LURASIDONE HYDROCHLORIDE 20 MG: 20 TABLET, COATED ORAL at 17:09

## 2024-11-04 RX ADMIN — DOCUSATE SODIUM 100 MG: 100 CAPSULE, LIQUID FILLED ORAL at 08:23

## 2024-11-04 RX ADMIN — FUROSEMIDE 40 MG: 20 TABLET ORAL at 08:25

## 2024-11-04 RX ADMIN — GABAPENTIN 400 MG: 400 CAPSULE ORAL at 20:08

## 2024-11-04 RX ADMIN — Medication 1 TABLET: at 08:24

## 2024-11-04 RX ADMIN — POLYETHYLENE GLYCOL 3350 17 G: 17 POWDER, FOR SOLUTION ORAL at 08:25

## 2024-11-04 RX ADMIN — METOPROLOL SUCCINATE 25 MG: 25 TABLET, EXTENDED RELEASE ORAL at 08:24

## 2024-11-04 RX ADMIN — DULOXETINE HYDROCHLORIDE 60 MG: 30 CAPSULE, DELAYED RELEASE PELLETS ORAL at 08:22

## 2024-11-04 RX ADMIN — GABAPENTIN 400 MG: 400 CAPSULE ORAL at 13:41

## 2024-11-04 RX ADMIN — DOCUSATE SODIUM 100 MG: 100 CAPSULE, LIQUID FILLED ORAL at 20:08

## 2024-11-04 RX ADMIN — TOLTERODINE 4 MG: 4 CAPSULE, EXTENDED RELEASE ORAL at 08:22

## 2024-11-04 RX ADMIN — GABAPENTIN 400 MG: 400 CAPSULE ORAL at 08:23

## 2024-11-04 RX ADMIN — QUETIAPINE FUMARATE 12.5 MG: 25 TABLET ORAL at 20:08

## 2024-11-04 RX ADMIN — Medication 100 MG: at 08:22

## 2024-11-04 RX ADMIN — LISINOPRIL 20 MG: 20 TABLET ORAL at 08:22

## 2024-11-04 RX ADMIN — EMPAGLIFLOZIN 10 MG: 10 TABLET, FILM COATED ORAL at 08:24

## 2024-11-04 ASSESSMENT — ACTIVITIES OF DAILY LIVING (ADL)
ADLS_ACUITY_SCORE: 0
DRESS: SCRUBS (BEHAVIORAL HEALTH)
ADLS_ACUITY_SCORE: 0
ORAL_HYGIENE: INDEPENDENT
LAUNDRY: UNABLE TO COMPLETE
ADLS_ACUITY_SCORE: 0
HYGIENE/GROOMING: INDEPENDENT
ADLS_ACUITY_SCORE: 0

## 2024-11-04 NOTE — PLAN OF CARE
"SHIFT SUMMARY:  Denies suicidal ideation, pain and hallucinations. Stated \"I don't have any direction. I'm scared of getting a job and I'm scared of not having a job. I don't know what to do.\" Sociable in the lounge with peers and staff. Watched the football game. Eating at least 50% of meals. Attended group. Medication compliant.       Problem: Adult Behavioral Health Plan of Care  Goal: Patient-Specific Goal (Individualization)  Description: Patient will sleep 6 to 8 hours per night  Patient will eat at least 50% of meals  Patient will attend at least 50% of groups  Patient will comply with recommendations of treatment team  Patient will remain medication compliant      Outcome: Progressing     Problem: Depression  Goal: Decreased Depression Symptoms  Description: Patient will verbalize a decrease in depression, and increase in mood by discharge.  Outcome: Progressing     Problem: Suicide Risk  Goal: Absence of Self-Harm  Description: Patient will be free of suicidal ideation or intent by discharge.  Outcome: Progressing   Goal Outcome Evaluation:                        "

## 2024-11-04 NOTE — PROGRESS NOTES
Occupational Therapy Discharge Summary    Reason for therapy discharge:    Discharged to home.    Progress towards therapy goal(s). See goals on Care Plan in [facility] electronic health record for goal details.  Goals partially met.  Barriers to achieving goals:   discharge from facility.    Therapy recommendation(s):    Pursue [facility] placement.

## 2024-11-04 NOTE — PLAN OF CARE
Face to face end of shift report received from Karl CHEEMA. Rounding completed. Patient observed in bed appears asleep.     Problem: Adult Behavioral Health Plan of Care  Goal: Patient-Specific Goal (Individualization)  Description: Patient will sleep 6 to 8 hours per night  Patient will eat at least 50% of meals  Patient will attend at least 50% of groups  Patient will comply with recommendations of treatment team  Patient will remain medication compliant      Outcome: Progressing     Problem: Depression  Goal: Decreased Depression Symptoms  Description: Patient will verbalize a decrease in depression, and increase in mood by discharge.  Outcome: Progressing     Problem: Suicide Risk  Goal: Absence of Self-Harm  Description: Patient will be free of suicidal ideation or intent by discharge.  Outcome: Progressing   Goal Outcome Evaluation:           Patient's BG is 120 at 0619.    Patient slept with Darwin socks on overnight. Socks removed this morning at 0620.    No observed episodes of SIB this shift. Patient slept (6.75) hours. Face to face end of shift report communicated to oncoming shift.     Nikolai Beckford RN  11/4/2024  0604 AM

## 2024-11-04 NOTE — PLAN OF CARE
Problem: Adult Behavioral Health Plan of Care  Goal: Patient-Specific Goal (Individualization)  Description: Patient will sleep 6 to 8 hours per night  Patient will eat at least 50% of meals  Patient will attend at least 50% of groups  Patient will comply with recommendations of treatment team  Patient will remain medication compliant      Outcome: Progressing  Note: Face to face start of shift report received from RN.  Rounding complete, patient in [facility] at this time.     Codi Stephen RN  11/4/2024  7:45 AM      Patient up in unit in [facility].  Reports anxiety related to discharge and not being aware of the exact plan.    Patient eats 100% of meals.      Patient denies SI/HI, AH/VH and pain.  Patient gait steady and balanced.   Patient lets needs be known.        Goal Outcome Evaluation:

## 2024-11-04 NOTE — PLAN OF CARE
Problem: Adult Behavioral Health Plan of Care  Goal: Patient-Specific Goal (Individualization)  Description: Patient will sleep 6 to 8 hours per night  Patient will eat at least 50% of meals  Patient will attend at least 50% of groups  Patient will comply with recommendations of treatment team  Patient will remain medication compliant  Outcome: Progressing     Problem: Depression  Goal: Decreased Depression Symptoms  Description: Patient will verbalize a decrease in depression, and increase in mood by discharge.  Outcome: Progressing     Problem: Suicide Risk  Goal: Absence of Self-Harm  Description: Patient will be free of suicidal ideation or intent by discharge.  Outcome: Progressing    Face to face shift report received from previously assigned nurse. Rounding completed, pt observed sitting in the lounge, watching television.    Patient is met with in the lounge. Denies pain, SI, HI, A/V hallucinations, and depression. Endorses anxiety, although states this is related to discharge. Patient denies interventions at this time. Appropriate with staff and peers, maintains appropriate boundaries. No further needs at this time.     BG prior to dinner - 110    Compliant with HS medication administration. No further needs at this time.     Face to face report communicated to oncjody CHEEMA.    Harriet Huff RN  11/4/2024  5:06 PM

## 2024-11-05 VITALS
DIASTOLIC BLOOD PRESSURE: 65 MMHG | RESPIRATION RATE: 18 BRPM | OXYGEN SATURATION: 94 % | BODY MASS INDEX: 33.95 KG/M2 | WEIGHT: 250.3 LBS | SYSTOLIC BLOOD PRESSURE: 126 MMHG | TEMPERATURE: 98.1 F | HEART RATE: 71 BPM

## 2024-11-05 PROCEDURE — 250N000013 HC RX MED GY IP 250 OP 250 PS 637: Performed by: NURSE PRACTITIONER

## 2024-11-05 RX ADMIN — DULOXETINE HYDROCHLORIDE 60 MG: 30 CAPSULE, DELAYED RELEASE PELLETS ORAL at 06:36

## 2024-11-05 RX ADMIN — FUROSEMIDE 40 MG: 20 TABLET ORAL at 06:36

## 2024-11-05 RX ADMIN — FLUTICASONE FUROATE AND VILANTEROL TRIFENATATE 1 PUFF: 100; 25 POWDER RESPIRATORY (INHALATION) at 06:35

## 2024-11-05 RX ADMIN — POLYETHYLENE GLYCOL 3350 17 G: 17 POWDER, FOR SOLUTION ORAL at 06:36

## 2024-11-05 RX ADMIN — ASPIRIN 81 MG: 81 TABLET, COATED ORAL at 06:36

## 2024-11-05 RX ADMIN — DOCUSATE SODIUM 100 MG: 100 CAPSULE, LIQUID FILLED ORAL at 06:36

## 2024-11-05 RX ADMIN — Medication 1 TABLET: at 06:36

## 2024-11-05 RX ADMIN — Medication 100 MG: at 06:36

## 2024-11-05 RX ADMIN — METOPROLOL SUCCINATE 25 MG: 25 TABLET, EXTENDED RELEASE ORAL at 06:36

## 2024-11-05 RX ADMIN — GABAPENTIN 400 MG: 400 CAPSULE ORAL at 06:36

## 2024-11-05 RX ADMIN — EMPAGLIFLOZIN 10 MG: 10 TABLET, FILM COATED ORAL at 06:36

## 2024-11-05 RX ADMIN — TOLTERODINE 4 MG: 4 CAPSULE, EXTENDED RELEASE ORAL at 06:36

## 2024-11-05 RX ADMIN — LISINOPRIL 20 MG: 20 TABLET ORAL at 06:36

## 2024-11-05 ASSESSMENT — ACTIVITIES OF DAILY LIVING (ADL)
HYGIENE/GROOMING: INDEPENDENT
ADLS_ACUITY_SCORE: 0
ADLS_ACUITY_SCORE: 0
LAUNDRY: UNABLE TO COMPLETE
ADLS_ACUITY_SCORE: 0
ADLS_ACUITY_SCORE: 0
DRESS: INDEPENDENT;SCRUBS (BEHAVIORAL HEALTH)
ADLS_ACUITY_SCORE: 0
ORAL_HYGIENE: INDEPENDENT
ADLS_ACUITY_SCORE: 0

## 2024-11-05 NOTE — PLAN OF CARE
Problem: Adult Behavioral Health Plan of Care  Goal: Patient-Specific Goal (Individualization)  Description: Patient will sleep 6 to 8 hours per night  Patient will eat at least 50% of meals  Patient will attend at least 50% of groups  Patient will comply with recommendations of treatment team  Patient will remain medication compliant      Outcome: Progressing     Problem: Suicide Risk  Goal: Absence of Self-Harm  Description: Patient will be free of suicidal ideation or intent by discharge.  Outcome: Progressing     Face to face shift report received from Harriet CHEEMA. Rounding completed, pt observed.     Pt appeared to sleep most of this shift. Discharge AVS and belongings went over with pt this shift, home medications and medications from [facility] placed in suitcase. Morning medications given due to pt discharging early.    Face to face report will be communicated to oncoming RN.    Jessica Banda RN  11/5/2024  6:14 AM

## 2024-11-05 NOTE — PLAN OF CARE
"Face to face end of shift report received from Jessica SORENSON RN. Rounding completed. Patient observed in lounge.     Pt sitting in the lounge waiting for his transportation to come. He states that he feels he is doing well, but not 100 %. Pt states he is happy that he does have services set up and feels that these will be beneficial. Pt states \"I got fat here. My buttons are going to burst. I don't eat at home so I will lose it right away. It's not that I can't cook I am just too lazy to cook.\" Encouraged for pt to eat at least one sufficient meal a day. Pt was incontinent of stool prior to discharging. Pt independently cleaned himself. Pt had no concerns prior to discharge. He denied any SI/HI and AH/VH. Denied pain. Steady gait- no falls.       Problem: Adult Behavioral Health Plan of Care  Goal: Adheres to Safety Considerations for Self and Others  Intervention: Develop and Maintain Individualized Safety Plan  Recent Flowsheet Documentation  Taken 11/5/2024 0820 by Maxine Killian RN  Safety Measures: safety plan reviewed     Problem: Adult Behavioral Health Plan of Care  Goal: Optimized Coping Skills in Response to Life Stressors  Intervention: Promote Effective Coping Strategies  Recent Flowsheet Documentation  Taken 11/5/2024 0820 by Maxine Killian RN  Supportive Measures:   active listening utilized   decision-making supported   verbalization of feelings encouraged     Problem: Adult Behavioral Health Plan of Care  Goal: Develops/Participates in Therapeutic [place] to Support Successful Transition  Intervention: Mutually Develop Transition Plan  Recent Flowsheet Documentation  Taken 11/5/2024 0820 by Maxine Killian RN  Transition Support: follow-up care coordinated     Problem: Suicide Risk  Goal: Absence of Self-Harm  Description: Patient will be free of suicidal ideation or intent by discharge.  Intervention: Assess Risk to Self and Maintain Safety  Recent Flowsheet Documentation  Taken 11/5/2024 0820 by Maria D" Maxine WILKINSON RN  Behavior Management: behavioral plan reviewed  Self-Harm Prevention: environmental self-harm risks assessed       Maxine Killian RN  11/5/2024  9:03 AM

## 2024-11-05 NOTE — PROGRESS NOTES
Pt is discharging at the recommendation of the treatment team. Pt is discharging to home transported by [employer]. Pt denies having any thoughts of hurting themself or anyone else. Pt denies anxiety or depression. Pt has follow up with psychiatry, therapy and PCP. Discharge instructions, including; demographic sheet, psychiatric evaluation, discharge summary, and AVS were faxed to these next level of care providers.     [facility]   [address] [address]   [phone]   Flora Hernández- November 12th @ 10:00am.     [facility]   [address] [place] [address]   [phone]   Psychiatry: Aleisha Mohan- November 26th @ 1:00pm     [facility]   [address] [place] [address]   [phone]   PCP: Dr. Veliz- November 8th @ 9:25am

## 2024-11-05 NOTE — PLAN OF CARE
Discharge Note    Patient Discharged to home on 11/5/2024 0820 AM via [employer] accompanied by unit assistant.     Patient informed of discharge instructions in AVS. patient verbalizes understanding and denies having any questions pertaining to AVS. Patient stable at time of discharge. Patient denies SI, HI, and thoughts of self harm at time of discharge. All personal belongings returned to patient. Discharge prescriptions sent to [facility] via electronic communication. Psych evaluation, history and physical, AVS, and discharge summary faxed to next level of care- per .     Maxine Killian RN  11/5/2024  9:30 AM